# Patient Record
Sex: MALE | NOT HISPANIC OR LATINO | Employment: UNEMPLOYED | URBAN - METROPOLITAN AREA
[De-identification: names, ages, dates, MRNs, and addresses within clinical notes are randomized per-mention and may not be internally consistent; named-entity substitution may affect disease eponyms.]

---

## 2020-08-28 ENCOUNTER — HOSPITAL ENCOUNTER (EMERGENCY)
Facility: HOSPITAL | Age: 35
Discharge: HOME | End: 2020-08-28
Attending: INTERNAL MEDICINE | Admitting: INTERNAL MEDICINE
Payer: COMMERCIAL

## 2020-08-28 VITALS
OXYGEN SATURATION: 100 % | TEMPERATURE: 99.8 F | DIASTOLIC BLOOD PRESSURE: 78 MMHG | BODY MASS INDEX: 21.97 KG/M2 | RESPIRATION RATE: 12 BRPM | HEIGHT: 67 IN | WEIGHT: 140 LBS | SYSTOLIC BLOOD PRESSURE: 122 MMHG | HEART RATE: 77 BPM

## 2020-08-28 DIAGNOSIS — D72.829 LEUKOCYTOSIS: ICD-10-CM

## 2020-08-28 DIAGNOSIS — J02.9 ACUTE PHARYNGITIS: ICD-10-CM

## 2020-08-28 DIAGNOSIS — R00.0 TACHYCARDIA: Primary | ICD-10-CM

## 2020-08-28 DIAGNOSIS — E05.90 HYPERTHYROIDISM: ICD-10-CM

## 2020-08-28 LAB
ALBUMIN SERPL BCP-MCNC: 4.6 G/DL (ref 3.4–4.8)
ALP SERPL-CCNC: 56.6 U/L (ref 10–129)
ALT SERPL W P-5'-P-CCNC: 22 U/L (ref 5–63)
ANION GAP SERPL CALCULATED.3IONS-SCNC: 8 MMOL/L (ref 4–13)
AST SERPL W P-5'-P-CCNC: 32 U/L (ref 15–41)
BASOPHILS # BLD AUTO: 0.04 THOUSANDS/ΜL (ref 0–0.1)
BASOPHILS NFR BLD AUTO: 0 % (ref 0–1)
BILIRUB SERPL-MCNC: 1.93 MG/DL (ref 0.3–1.2)
BUN SERPL-MCNC: 12 MG/DL (ref 6–20)
CALCIUM SERPL-MCNC: 9.8 MG/DL (ref 8.4–10.2)
CHLORIDE SERPL-SCNC: 102 MMOL/L (ref 96–108)
CO2 SERPL-SCNC: 25 MMOL/L (ref 22–33)
CREAT SERPL-MCNC: 1.06 MG/DL (ref 0.5–1.2)
EOSINOPHIL # BLD AUTO: 0.02 THOUSAND/ΜL (ref 0–0.61)
EOSINOPHIL NFR BLD AUTO: 0 % (ref 0–6)
ERYTHROCYTE [DISTWIDTH] IN BLOOD BY AUTOMATED COUNT: 13.5 % (ref 11.6–15.1)
GFR SERPL CREATININE-BSD FRML MDRD: 92 ML/MIN/1.73SQ M
GLUCOSE SERPL-MCNC: 108 MG/DL (ref 65–140)
HCT VFR BLD AUTO: 42.2 % (ref 36.5–49.3)
HETEROPH AB SER QL: NEGATIVE
HGB BLD-MCNC: 14.8 G/DL (ref 12–17)
IMM GRANULOCYTES # BLD AUTO: 0.06 THOUSAND/UL (ref 0–0.2)
IMM GRANULOCYTES NFR BLD AUTO: 0 % (ref 0–2)
LYMPHOCYTES # BLD AUTO: 1.29 THOUSANDS/ΜL (ref 0.6–4.47)
LYMPHOCYTES NFR BLD AUTO: 7 % (ref 14–44)
MCH RBC QN AUTO: 32 PG (ref 26.8–34.3)
MCHC RBC AUTO-ENTMCNC: 35.1 G/DL (ref 31.4–37.4)
MCV RBC AUTO: 91 FL (ref 82–98)
MONOCYTES # BLD AUTO: 1.7 THOUSAND/ΜL (ref 0.17–1.22)
MONOCYTES NFR BLD AUTO: 9 % (ref 4–12)
NEUTROPHILS # BLD AUTO: 15.24 THOUSANDS/ΜL (ref 1.85–7.62)
NEUTS SEG NFR BLD AUTO: 84 % (ref 43–75)
PLATELET # BLD AUTO: 191 THOUSANDS/UL (ref 149–390)
PMV BLD AUTO: 9.6 FL (ref 8.9–12.7)
POTASSIUM SERPL-SCNC: 4.2 MMOL/L (ref 3.5–5)
PROT SERPL-MCNC: 7.3 G/DL (ref 6.4–8.3)
RBC # BLD AUTO: 4.62 MILLION/UL (ref 3.88–5.62)
S PYO DNA THROAT QL NAA+PROBE: NORMAL
SODIUM SERPL-SCNC: 135 MMOL/L (ref 133–145)
TSH SERPL DL<=0.05 MIU/L-ACNC: 0.32 UIU/ML (ref 0.34–5.6)
WBC # BLD AUTO: 18.35 THOUSAND/UL (ref 4.31–10.16)

## 2020-08-28 PROCEDURE — 99284 EMERGENCY DEPT VISIT MOD MDM: CPT | Performed by: PHYSICIAN ASSISTANT

## 2020-08-28 PROCEDURE — 84443 ASSAY THYROID STIM HORMONE: CPT | Performed by: PHYSICIAN ASSISTANT

## 2020-08-28 PROCEDURE — 87651 STREP A DNA AMP PROBE: CPT | Performed by: PHYSICIAN ASSISTANT

## 2020-08-28 PROCEDURE — 96361 HYDRATE IV INFUSION ADD-ON: CPT

## 2020-08-28 PROCEDURE — 85025 COMPLETE CBC W/AUTO DIFF WBC: CPT | Performed by: PHYSICIAN ASSISTANT

## 2020-08-28 PROCEDURE — 93005 ELECTROCARDIOGRAM TRACING: CPT

## 2020-08-28 PROCEDURE — 86308 HETEROPHILE ANTIBODY SCREEN: CPT | Performed by: PHYSICIAN ASSISTANT

## 2020-08-28 PROCEDURE — 96360 HYDRATION IV INFUSION INIT: CPT

## 2020-08-28 PROCEDURE — 80053 COMPREHEN METABOLIC PANEL: CPT | Performed by: PHYSICIAN ASSISTANT

## 2020-08-28 PROCEDURE — 99283 EMERGENCY DEPT VISIT LOW MDM: CPT

## 2020-08-28 PROCEDURE — U0003 INFECTIOUS AGENT DETECTION BY NUCLEIC ACID (DNA OR RNA); SEVERE ACUTE RESPIRATORY SYNDROME CORONAVIRUS 2 (SARS-COV-2) (CORONAVIRUS DISEASE [COVID-19]), AMPLIFIED PROBE TECHNIQUE, MAKING USE OF HIGH THROUGHPUT TECHNOLOGIES AS DESCRIBED BY CMS-2020-01-R: HCPCS | Performed by: PHYSICIAN ASSISTANT

## 2020-08-28 PROCEDURE — 36415 COLL VENOUS BLD VENIPUNCTURE: CPT | Performed by: PHYSICIAN ASSISTANT

## 2020-08-28 RX ORDER — CETIRIZINE HYDROCHLORIDE 5 MG/1
5 TABLET ORAL DAILY
COMMUNITY
End: 2021-11-17

## 2020-08-28 RX ORDER — ACETAMINOPHEN 325 MG/1
650 TABLET ORAL ONCE
Status: COMPLETED | OUTPATIENT
Start: 2020-08-28 | End: 2020-08-28

## 2020-08-28 RX ADMIN — ACETAMINOPHEN 650 MG: 325 TABLET, FILM COATED ORAL at 12:41

## 2020-08-28 RX ADMIN — SODIUM CHLORIDE 1000 ML: 0.9 INJECTION, SOLUTION INTRAVENOUS at 11:01

## 2020-08-28 RX ADMIN — SODIUM CHLORIDE 1000 ML: 0.9 INJECTION, SOLUTION INTRAVENOUS at 11:56

## 2020-08-28 NOTE — ED PROVIDER NOTES
History  Chief Complaint   Patient presents with    Sore Throat     Patient reports bilateral sore throat, sneezing  has been using Zyrtec  denies fevers     30-year-old male comes in today with concerns of a sore throat  He reports that he has been sneezing for the last 2 weeks and has been using Zyrtec with relief  This morning he woke up with a sore throat  Denies any fevers  No difficulty swallowing  Has not had any coffee or tea this morning  He smokes medical marijuana and occasionally drinks alcohol  No CP or SOB  No recent sick contacts          Prior to Admission Medications   Prescriptions Last Dose Informant Patient Reported? Taking? cetirizine (ZyrTEC) 5 MG tablet   Yes Yes   Sig: Take 5 mg by mouth daily      Facility-Administered Medications: None       Past Medical History:   Diagnosis Date    Marijuana use        No past surgical history on file  No family history on file  I have reviewed and agree with the history as documented  E-Cigarette/Vaping     E-Cigarette/Vaping Substances     Social History     Tobacco Use    Smoking status: Never Smoker    Smokeless tobacco: Never Used   Substance Use Topics    Alcohol use: Not on file    Drug use: Not on file       Review of Systems   Constitutional: Negative for fever  HENT: Positive for sneezing and sore throat  Negative for facial swelling  Eyes: Negative for redness  Respiratory: Negative for shortness of breath  Cardiovascular: Negative for chest pain  Gastrointestinal: Negative for abdominal pain  Musculoskeletal: Negative for back pain  Skin: Negative for rash  Neurological: Negative for headaches  Psychiatric/Behavioral: Negative for confusion  Physical Exam  Physical Exam  Constitutional:       Appearance: He is well-developed  HENT:      Head: Normocephalic and atraumatic  Right Ear: There is impacted cerumen (not impacted, but unable to visualize TM due to cerumen in outer ear canal)  Left Ear: Tympanic membrane normal       Nose: Septal deviation and rhinorrhea (clear, slight) present  Mouth/Throat:      Mouth: Mucous membranes are moist       Pharynx: Uvula midline  Posterior oropharyngeal erythema (mild) present  No pharyngeal swelling, oropharyngeal exudate or uvula swelling  Tonsils: No tonsillar exudate or tonsillar abscesses  2+ on the right  2+ on the left  Eyes:      Conjunctiva/sclera: Conjunctivae normal    Neck:      Musculoskeletal: Normal range of motion  No erythema  Thyroid: No thyroid mass, thyromegaly or thyroid tenderness  Cardiovascular:      Rate and Rhythm: Regular rhythm  Tachycardia present  Comments: Tachycardia ranging from 105-135 bpm on monitor, regular  Pulmonary:      Effort: Pulmonary effort is normal    Musculoskeletal: Normal range of motion  Skin:     General: Skin is warm and dry  Neurological:      Mental Status: He is alert and oriented to person, place, and time     Psychiatric:         Behavior: Behavior normal          Vital Signs  ED Triage Vitals   Temperature Pulse Respirations Blood Pressure SpO2   08/28/20 1047 08/28/20 1047 08/28/20 1047 08/28/20 1047 08/28/20 1047   99 8 °F (37 7 °C) (!) 108 18 103/78 98 %      Temp Source Heart Rate Source Patient Position - Orthostatic VS BP Location FiO2 (%)   08/28/20 1047 08/28/20 1047 08/28/20 1047 08/28/20 1047 --   Tympanic Monitor Lying Left arm       Pain Score       08/28/20 1241       6           Vitals:    08/28/20 1110 08/28/20 1112 08/28/20 1115 08/28/20 1154   BP: 117/69 122/79 111/67 122/78   Pulse: 83 (!) 106 (!) 139 77   Patient Position - Orthostatic VS: Lying - Orthostatic VS Sitting - Orthostatic VS Standing - Orthostatic VS Lying         Visual Acuity      ED Medications  Medications   sodium chloride 0 9 % bolus 1,000 mL (0 mL Intravenous Stopped 8/28/20 1154)   sodium chloride 0 9 % bolus 1,000 mL (0 mL Intravenous Stopped 8/28/20 1250)   acetaminophen (TYLENOL) tablet 650 mg (650 mg Oral Given 8/28/20 1241)       Diagnostic Studies  Results Reviewed     Procedure Component Value Units Date/Time    Novel Coronavirus (COVID-19), PCR [facility] [[id number]] Collected:  08/28/20 1244    Lab Status: In process Specimen:  Nares from Nasopharyngeal Swab Updated:  08/28/20 1246    Mononucleosis screen [[id number]]     Lab Status:  No result Specimen:  Blood     Strep A PCR [[id number]]  (Normal) Collected:  08/28/20 1100    Lab Status:  Final result Specimen:  Throat Updated:  08/28/20 1207     STREP A PCR None Detected    TSH [[id number]]  (Abnormal) Collected:  08/28/20 1100    Lab Status:  Final result Specimen:  Blood from Hand, Left Updated:  08/28/20 1147     TSH 3RD GENERATON 0 324 uIU/mL     Narrative:       Patients undergoing fluorescein dye angiography may retain small amounts of fluorescein in the body for 48-72 hours post procedure  Samples containing fluorescein can produce falsely depressed TSH values  If the patient had this procedure,a specimen should be resubmitted post fluorescein clearance        Comprehensive metabolic panel [[id number]]  (Abnormal) Collected:  08/28/20 1100    Lab Status:  Final result Specimen:  Blood from Hand, Left Updated:  08/28/20 1133     Sodium 135 mmol/L      Potassium 4 2 mmol/L      Chloride 102 mmol/L      CO2 25 mmol/L      ANION GAP 8 mmol/L      BUN 12 mg/dL      Creatinine 1 06 mg/dL      Glucose 108 mg/dL      Calcium 9 8 mg/dL      AST 32 U/L      ALT 22 U/L      Alkaline Phosphatase 56 6 U/L      Total Protein 7 3 g/dL      Albumin 4 6 g/dL      Total Bilirubin 1 93 mg/dL      eGFR 92 ml/min/1 73sq m     Narrative:       Destinee guidelines for Chronic Kidney Disease (CKD):     Stage 1 with normal or high GFR (GFR > 90 mL/min/1 73 square meters)    Stage 2 Mild CKD (GFR = 60-89 mL/min/1 73 square meters)    Stage 3A Moderate CKD (GFR = 45-59 mL/min/1 73 square meters)    Stage 3B Moderate CKD (GFR = 30-44 mL/min/1 73 square meters)    Stage 4 Severe CKD (GFR = 15-29 mL/min/1 73 square meters)    Stage 5 End Stage CKD (GFR <15 mL/min/1 73 square meters)  Note: GFR calculation is accurate only with a steady state creatinine    CBC and differential [[id number]]  (Abnormal) Collected:  08/28/20 1100    Lab Status:  Final result Specimen:  Blood from Hand, Left Updated:  08/28/20 1106     WBC 18 35 Thousand/uL      RBC 4 62 Million/uL      Hemoglobin 14 8 g/dL      Hematocrit 42 2 %      MCV 91 fL      MCH 32 0 pg      MCHC 35 1 g/dL      RDW 13 5 %      MPV 9 6 fL      Platelets 033 Thousands/uL      Neutrophils Relative 84 %      Immat GRANS % 0 %      Lymphocytes Relative 7 %      Monocytes Relative 9 %      Eosinophils Relative 0 %      Basophils Relative 0 %      Neutrophils Absolute 15 24 Thousands/µL      Immature Grans Absolute 0 06 Thousand/uL      Lymphocytes Absolute 1 29 Thousands/µL      Monocytes Absolute 1 70 Thousand/µL      Eosinophils Absolute 0 02 Thousand/µL      Basophils Absolute 0 04 Thousands/µL                  No orders to display              Procedures  Procedures         ED Course  ED Course as of Aug 28 1250   Fri Aug 28, 2020   1100 EKG performed at 1054 shows sinus tachycardia with a rate of 106 beats per minute, normal axis, no ectopy, normal J-point elevation, somewhat flattened T-waves inferior and laterally      1250 Upon trying to discharge the patient, he was complaining of body aches and worsening sore throat  I re-examined his throat which still does not show any signs of peritonsillar abscess  Still only has mild erythema  Will add on COVID screen as well as mono  US AUDIT      Most Recent Value   Initial Alcohol Screen: US AUDIT-C    1  How often do you have a drink containing alcohol? 3 Filed at: 08/28/2020 1050   2  How many drinks containing alcohol do you have on a typical day you are drinking? 2 Filed at: 08/28/2020 1050   3a  Male UNDER 65:  How often do you have five or more drinks on one occasion? 0 Filed at: 08/28/2020 1050   Audit-C Score  5 Filed at: 08/28/2020 1050                  ELMER/DAST-10      Most Recent Value   How many times in the past year have you    Used an illegal drug or used a prescription medication for non-medical reasons? Never [medical Miles Bottcher at: 08/28/2020 1049   In the past 12 months      1  Have you used drugs other than those required for medical reasons? 0 Filed at: 08/28/2020 1049   2  Do you use more than one drug at a time? 0 Filed at: 08/28/2020 1049   3  Have you had medical problems as a result of your drug use (e g , memory loss, hepatitis, convulsions, bleeding, etc )? 0 Filed at: 08/28/2020 1049   4  Have you had "blackouts" or "flashbacks" as a result of drug use? YesNo  0 Filed at: 08/28/2020 1049   5  Do you ever feel bad or guilty about your drug use? 0 Filed at: 08/28/2020 1049   6  Does your spouse (or parent) ever complain about your involvement with drugs? 0 Filed at: 08/28/2020 1049   7  Have you neglected your family because of your use of drugs? 0 Filed at: 08/28/2020 1049   8  Have you engaged in illegal activities in order to obtain drugs? 0 Filed at: 08/28/2020 1049   9  Have you ever experienced withdrawal symptoms (felt sick) when you stopped taking drugs? 0 Filed at: 08/28/2020 1049   10  Are you always able to stop using drugs when you want to?  0 Filed at: 08/28/2020 1049   DAST-10 Score  0 Filed at: 08/28/2020 1049                                MDM  Number of Diagnoses or Management Options  Acute pharyngitis:   Tachycardia:   Diagnosis management comments: 81-year-old male coming in today complaining of a sore throat that began today  There is no evidence of tonsillar abscess that and he only has mild erythema  Given patient's WBC of 18, favor viral etiology  Will send mono screen as well    I explained to the patient that his TSH was slightly low and he may have hyperactive thyroid and he should follow up with the primary care physician to further manage this  Explained this might be contributing to his tachycardia  Advised over-the-counter remedies for his sore throat and to return to the emergency department for any new or worsening symptoms  Disposition  Final diagnoses:   Tachycardia   Acute pharyngitis   Hyperthyroidism   Leukocytosis     Time reflects when diagnosis was documented in both MDM as applicable and the Disposition within this note     Time User Action Codes Description Comment    8/28/2020 11:06 AM Donzell Norse Add [R00 0] Tachycardia     8/28/2020 12:25 PM Donzell Norse Add [J02 9] Acute pharyngitis     8/28/2020 12:26 PM Donzell Norse Add [E05 90] Hyperthyroidism     8/28/2020 12:26 PM Donzell Norse Add [B46 714] Leukocytosis       ED Disposition     ED Disposition Condition Date/Time Comment    Discharge Stable Fri Aug 28, 2020 12:27 PM Guillermina Long discharge to home/self care  Follow-up Information     Follow up With Specialties Details Why Contact Info    Cristina Quiroga MD Family Medicine   [id number] [place] [address],[address]  [address]  [address]  [phone]      [facility]    [phone]            Patient's Medications   Discharge Prescriptions    No medications on file     No discharge procedures on file      PDMP Review     None          ED Provider  Electronically Signed by           Rolando Ba PA-C  08/28/20 7400 Jarrett Brush PA-C  08/28/20 4100 Jarrett Brush PA-C  08/28/20 2426

## 2020-08-28 NOTE — DISCHARGE INSTRUCTIONS
Return to the emergency department for any new worsening or concerning symptoms  Follow-up with your primary care physician regarding your abnormal thyroid test and your elevated white blood cell count

## 2020-08-29 ENCOUNTER — APPOINTMENT (EMERGENCY)
Dept: RADIOLOGY | Facility: HOSPITAL | Age: 35
End: 2020-08-29
Payer: COMMERCIAL

## 2020-08-29 ENCOUNTER — HOSPITAL ENCOUNTER (EMERGENCY)
Facility: HOSPITAL | Age: 35
Discharge: HOME | End: 2020-08-29
Payer: COMMERCIAL

## 2020-08-29 VITALS
WEIGHT: 140 LBS | OXYGEN SATURATION: 100 % | RESPIRATION RATE: 18 BRPM | HEIGHT: 67 IN | DIASTOLIC BLOOD PRESSURE: 64 MMHG | BODY MASS INDEX: 21.97 KG/M2 | HEART RATE: 104 BPM | TEMPERATURE: 98.7 F | SYSTOLIC BLOOD PRESSURE: 110 MMHG

## 2020-08-29 DIAGNOSIS — S43.401A SPRAIN OF RIGHT SHOULDER, UNSPECIFIED SHOULDER SPRAIN TYPE, INITIAL ENCOUNTER: Primary | ICD-10-CM

## 2020-08-29 PROCEDURE — 99283 EMERGENCY DEPT VISIT LOW MDM: CPT

## 2020-08-29 PROCEDURE — 72040 X-RAY EXAM NECK SPINE 2-3 VW: CPT

## 2020-08-29 PROCEDURE — 73030 X-RAY EXAM OF SHOULDER: CPT

## 2020-08-29 PROCEDURE — 99284 EMERGENCY DEPT VISIT MOD MDM: CPT

## 2020-08-29 PROCEDURE — 96372 THER/PROPH/DIAG INJ SC/IM: CPT

## 2020-08-29 RX ORDER — KETOROLAC TROMETHAMINE 30 MG/ML
30 INJECTION, SOLUTION INTRAMUSCULAR; INTRAVENOUS ONCE
Status: COMPLETED | OUTPATIENT
Start: 2020-08-29 | End: 2020-08-29

## 2020-08-29 RX ORDER — CYCLOBENZAPRINE HCL 10 MG
10 TABLET ORAL 2 TIMES DAILY PRN
Qty: 20 TABLET | Refills: 0 | Status: SHIPPED | OUTPATIENT
Start: 2020-08-29 | End: 2021-11-17

## 2020-08-29 RX ORDER — NAPROXEN 500 MG/1
500 TABLET ORAL 2 TIMES DAILY WITH MEALS
Qty: 30 TABLET | Refills: 0 | Status: SHIPPED | OUTPATIENT
Start: 2020-08-29 | End: 2021-11-17

## 2020-08-29 RX ADMIN — KETOROLAC TROMETHAMINE 30 MG: 30 INJECTION, SOLUTION INTRAMUSCULAR at 05:20

## 2020-08-29 NOTE — ED PROVIDER NOTES
History  Chief Complaint   Patient presents with    Shoulder Pain     woke up with right shoulder pain, was seen in this ed sore throat- pt tstaes he had shoulder pain yesterday as well but was not as bad as today  24-year-old male no significant past medical history is complaining of few days of right shoulder pain  Patient denies any specific injury although says he may have injured it with a certain lifting maneuver  Patient states he has also been having in symptoms from is right side of his neck all way down his right arm intermittently for the past month  Patient denies any direct trauma patient denies any fever chills patient denies any weakness patient denies any nausea vomiting  Patient is right handed  Patient was recently here in this emergency department for sore throat which was workup I did have a ovid test which is still pending  Prior to Admission Medications   Prescriptions Last Dose Informant Patient Reported? Taking? cetirizine (ZyrTEC) 5 MG tablet   Yes No   Sig: Take 5 mg by mouth daily      Facility-Administered Medications: None       Past Medical History:   Diagnosis Date    Marijuana use        History reviewed  No pertinent surgical history  History reviewed  No pertinent family history  I have reviewed and agree with the history as documented  E-Cigarette/Vaping     E-Cigarette/Vaping Substances     Social History     Tobacco Use    Smoking status: Never Smoker    Smokeless tobacco: Never Used   Substance Use Topics    Alcohol use: Yes     Frequency: 2-4 times a month     Drinks per session: 1 or 2     Binge frequency: Never    Drug use: Yes     Types: Marijuana     Comment: Medical marijuana       Review of Systems   Constitutional: Negative for chills and fever  HENT: Negative for congestion  Eyes: Negative for visual disturbance  Respiratory: Negative for shortness of breath  Cardiovascular: Negative for chest pain     Gastrointestinal: Negative for abdominal pain  Endocrine: Negative for cold intolerance  Genitourinary: Negative for frequency  Musculoskeletal: Positive for arthralgias and neck pain  Negative for gait problem  Skin: Negative for rash  Neurological: Negative for dizziness  Psychiatric/Behavioral: Negative for behavioral problems and confusion  Physical Exam  Physical Exam  Vitals signs and nursing note reviewed  Constitutional:       Appearance: He is well-developed  HENT:      Head: Normocephalic and atraumatic  Eyes:      Conjunctiva/sclera: Conjunctivae normal       Pupils: Pupils are equal, round, and reactive to light  Neck:      Musculoskeletal: Normal range of motion and neck supple  Cardiovascular:      Rate and Rhythm: Normal rate and regular rhythm  Heart sounds: Normal heart sounds  Pulmonary:      Effort: Pulmonary effort is normal       Breath sounds: Normal breath sounds  Abdominal:      General: Bowel sounds are normal       Palpations: Abdomen is soft  Musculoskeletal:      Comments: Patient has significant limitation abduction external rotation right shoulder he also has tenderness to palpation over the bicipital groove patient has 5/5 strength testing of the right upper extremity pulses are 2+ and strong at brachial and radial pulse check sites   Skin:     General: Skin is warm and dry  Capillary Refill: Capillary refill takes less than 2 seconds  Neurological:      Mental Status: He is alert and oriented to person, place, and time     Psychiatric:         Behavior: Behavior normal          Vital Signs  ED Triage Vitals [08/29/20 0501]   Temperature Pulse Respirations Blood Pressure SpO2   98 7 °F (37 1 °C) 104 18 110/64 100 %      Temp Source Heart Rate Source Patient Position - Orthostatic VS BP Location FiO2 (%)   Tympanic Monitor Lying Right arm --      Pain Score       9           Vitals:    08/29/20 0501   BP: 110/64   Pulse: 104   Patient Position - Orthostatic VS: Lying         Visual Acuity      ED Medications  Medications   ketorolac (TORADOL) injection 30 mg (30 mg Intramuscular Given 8/29/20 0520)       Diagnostic Studies  Results Reviewed     None                 XR shoulder 2+ views RIGHT   ED Interpretation by Jessica Marquez MD (08/29 2479)   X-ray right shoulder demonstrates no acute fracture no dislocation no bony abnormality      XR spine cervical 2 or 3 vw injury   ED Interpretation by Jessica Marquez MD (08/29 9608)   X-ray cervical spine demonstrates normal bony structure no acute fracture or no subluxation or dislocation                 Procedures  Procedures         ED Course                                             MDM  Number of Diagnoses or Management Options  Diagnosis management comments: Patient was monitored in the emergency department received Toradol 30 mg IM patient an arm sling placed x-ray right shoulder demonstrated no acute findings x-ray cervical spine demonstrate no acute findings  Plan is discharge patient home patient will be placed on naproxen Flexeril and given arm sling  Patient be referred to Orthopedics on-call for follow-up in 1 week if symptoms are not improving  Impression is right shoulder strain        Disposition  Final diagnoses:   Sprain of right shoulder, unspecified shoulder sprain type, initial encounter     Time reflects when diagnosis was documented in both MDM as applicable and the Disposition within this note     Time User Action Codes Description Comment    8/29/2020  6:25 AM Bhakti Alexis Add [S43 401A] Sprain of right shoulder, unspecified shoulder sprain type, initial encounter       ED Disposition     ED Disposition Condition Date/Time Comment    Discharge Stable Sat Aug 29, 2020  6:25 AM [facility] discharge to home/self care              Follow-up Information     Follow up With Specialties Details Why Contact Info Additional [address] [facility] [place] [facility] Schedule an appointment as soon as possible for a visit in 1 week If symptoms worsen [address] [facility], [address] [place], [place], [address] [phone]          Patient's Medications   Discharge Prescriptions    CYCLOBENZAPRINE (FLEXERIL) 10 MG TABLET    Take 1 tablet (10 mg total) by mouth 2 (two) times a day as needed for muscle spasms       Start Date: 8/29/2020 End Date: --       Order Dose: 10 mg       Quantity: 20 tablet    Refills: 0    NAPROXEN (NAPROSYN) 500 MG TABLET    Take 1 tablet (500 mg total) by mouth 2 (two) times a day with meals       Start Date: 8/29/2020 End Date: --       Order Dose: 500 mg       Quantity: 30 tablet    Refills: 0     No discharge procedures on file      PDMP Review     None          ED Provider  Electronically Signed by           Elizabeth Olsen MD  08/29/20 3621

## 2020-08-30 ENCOUNTER — TELEPHONE (OUTPATIENT)
Dept: OTHER | Facility: OTHER | Age: 35
End: 2020-08-30

## 2020-08-30 ENCOUNTER — HOSPITAL ENCOUNTER (EMERGENCY)
Facility: HOSPITAL | Age: 35
Discharge: HOME | End: 2020-08-30
Attending: EMERGENCY MEDICINE | Admitting: EMERGENCY MEDICINE
Payer: COMMERCIAL

## 2020-08-30 VITALS
TEMPERATURE: 98.9 F | HEART RATE: 79 BPM | SYSTOLIC BLOOD PRESSURE: 121 MMHG | BODY MASS INDEX: 21.93 KG/M2 | WEIGHT: 140 LBS | OXYGEN SATURATION: 100 % | DIASTOLIC BLOOD PRESSURE: 79 MMHG | RESPIRATION RATE: 18 BRPM

## 2020-08-30 DIAGNOSIS — L03.114 CELLULITIS OF LEFT UPPER EXTREMITY: Primary | ICD-10-CM

## 2020-08-30 DIAGNOSIS — M25.561 ARTHRALGIA OF RIGHT KNEE: ICD-10-CM

## 2020-08-30 DIAGNOSIS — S46.911A STRAIN OF RIGHT SHOULDER, INITIAL ENCOUNTER: ICD-10-CM

## 2020-08-30 LAB
ATRIAL RATE: 110 BPM
P AXIS: 59 DEGREES
PR INTERVAL: 189 MS
QRS AXIS: 81 DEGREES
QRSD INTERVAL: 84 MS
QT INTERVAL: 337 MS
QTC INTERVAL: 448 MS
SARS-COV-2 RNA SPEC QL NAA+PROBE: NOT DETECTED
T WAVE AXIS: 35 DEGREES
VENTRICULAR RATE: 106 BPM

## 2020-08-30 PROCEDURE — 93010 ELECTROCARDIOGRAM REPORT: CPT | Performed by: INTERNAL MEDICINE

## 2020-08-30 PROCEDURE — 99284 EMERGENCY DEPT VISIT MOD MDM: CPT | Performed by: EMERGENCY MEDICINE

## 2020-08-30 PROCEDURE — 99283 EMERGENCY DEPT VISIT LOW MDM: CPT

## 2020-08-30 RX ORDER — CEPHALEXIN 500 MG/1
500 CAPSULE ORAL EVERY 6 HOURS SCHEDULED
Qty: 28 CAPSULE | Refills: 0 | Status: SHIPPED | OUTPATIENT
Start: 2020-08-30 | End: 2020-09-06

## 2020-08-30 RX ORDER — CEPHALEXIN 250 MG/1
500 CAPSULE ORAL ONCE
Status: COMPLETED | OUTPATIENT
Start: 2020-08-30 | End: 2020-08-30

## 2020-08-30 RX ORDER — IBUPROFEN 600 MG/1
600 TABLET ORAL ONCE
Status: COMPLETED | OUTPATIENT
Start: 2020-08-30 | End: 2020-08-30

## 2020-08-30 RX ADMIN — IBUPROFEN 600 MG: 600 TABLET ORAL at 11:29

## 2020-08-30 RX ADMIN — CEPHALEXIN 500 MG: 250 CAPSULE ORAL at 11:29

## 2020-08-30 NOTE — TELEPHONE ENCOUNTER
The patient was called for notification of a test result for COVID-19  The patient did not answer the phone and a voicemail was left requesting a call back to [phone], Option 7

## 2020-08-30 NOTE — TELEPHONE ENCOUNTER
Your test for COVID-19, also known as novel coronavirus, came back negative  You do not have COVID-19  If you have any additional questions, we can schedule a virtual visit for you with a provider or call the [facility] [phone] Option 7 for care advice  For additional information , please visit the Coronavirus FAQ on the [address]  ([facility])

## 2020-08-30 NOTE — ED PROVIDER NOTES
History  Chief Complaint   Patient presents with    Hand Problem     Patient reports left hand swelling from getting blood work done yesterday at [facility]  States he woke up with his hand swollen   Knee Pain     Patient c/o right knee pain  "I think it's because I am using my right leg more since I was bite last year by a dog in my left leg "      Patient is a 43-year-old male with no significant past medical history who presents with left hand and right knee pain  Patient states that he was at [facility] 2 days ago for a sore throat and right shoulder pain  He states that they addressed his sore throat but did not address his shoulder pain  Therefore he returned yesterday and was diagnosed with right shoulder sprain  He was put in a sling and referred to Orthopedic surgery  Patient states that he had blood drawn and an IV placed in his left hand during his 1st visit  He describes swelling of his left hand that started about 24 hours after the IV was removed  The pain and swelling is primarily in his 2nd MCP area and extends proximally toward the area IV insertion  He denies fever, chills nausea, vomiting, myalgias  He does complain of atraumatic right knee pain  The pain is primarily along the medial aspect of his right knee  He attributes the pain to compensating for his chronic left leg pain  He sustained multiple dog bites to his left leg which left him with chronic pain  He is still able to ambulate despite his acute right knee pain  He has not been able to fill the prescription for naproxen that was written yesterday         History provided by:  Patient  Hand Pain   Location:  [place] hand  Severity:  Moderate  Duration:  1 day  Timing:  Constant  Progression:  Worsening  Chronicity:  New  Ineffective treatments:  None tried  Associated symptoms: no abdominal pain, no chest pain, no cough, no diarrhea, no fever, no headaches, no nausea, no rash, no shortness of breath, no sore throat and no vomiting        Prior to Admission Medications   Prescriptions Last Dose Informant Patient Reported? Taking? cetirizine (ZyrTEC) 5 MG tablet   Yes No   Sig: Take 5 mg by mouth daily   cyclobenzaprine (FLEXERIL) 10 mg tablet   No No   Sig: Take 1 tablet (10 mg total) by mouth 2 (two) times a day as needed for muscle spasms   naproxen (NAPROSYN) 500 mg tablet   No No   Sig: Take 1 tablet (500 mg total) by mouth 2 (two) times a day with meals      Facility-Administered Medications: None       Past Medical History:   Diagnosis Date    Marijuana use        History reviewed  No pertinent surgical history  History reviewed  No pertinent family history  I have reviewed and agree with the history as documented  E-Cigarette/Vaping     E-Cigarette/Vaping Substances     Social History     Tobacco Use    Smoking status: Never Smoker    Smokeless tobacco: Never Used   Substance Use Topics    Alcohol use: Yes     Frequency: 2-4 times a month     Drinks per session: 1 or 2     Binge frequency: Never    Drug use: Yes     Types: Marijuana     Comment: Medical marijuana       Review of Systems   Constitutional: Negative for chills, diaphoresis and fever  HENT: Negative for nosebleeds, sore throat and trouble swallowing  Eyes: Negative for photophobia, pain and visual disturbance  Respiratory: Negative for cough, chest tightness and shortness of breath  Cardiovascular: Negative for chest pain, palpitations and leg swelling  Gastrointestinal: Negative for abdominal pain, constipation, diarrhea, nausea and vomiting  Endocrine: Negative for polydipsia and polyuria  Genitourinary: Negative for difficulty urinating, dysuria and hematuria  Musculoskeletal: Positive for arthralgias  Negative for back pain, neck pain and neck stiffness  Skin: Negative for pallor and rash  Neurological: Negative for dizziness, syncope, light-headedness and headaches     All other systems reviewed and are negative  Physical Exam  Physical Exam  Vitals signs and nursing note reviewed  Constitutional:       General: He is not in acute distress  Appearance: He is well-developed  HENT:      Head: Normocephalic and atraumatic  Eyes:      Pupils: Pupils are equal, round, and reactive to light  Neck:      Musculoskeletal: Normal range of motion and neck supple  Cardiovascular:      Rate and Rhythm: Normal rate and regular rhythm  Pulses: Normal pulses  Heart sounds: Normal heart sounds  Pulmonary:      Effort: Pulmonary effort is normal  No respiratory distress  Breath sounds: Normal breath sounds  Abdominal:      General: There is no distension  Palpations: Abdomen is soft  Abdomen is not rigid  Tenderness: There is no abdominal tenderness  There is no guarding or rebound  Musculoskeletal: Normal range of motion  Right knee: He exhibits normal range of motion, no swelling and no effusion  Tenderness (Mild tenderness along the medial femoral condyle) found  Hands:    Lymphadenopathy:      Cervical: No cervical adenopathy  Skin:     General: Skin is warm and dry  Capillary Refill: Capillary refill takes less than 2 seconds  Neurological:      Mental Status: He is alert and oriented to person, place, and time  Cranial Nerves: No cranial nerve deficit  Sensory: No sensory deficit           Vital Signs  ED Triage Vitals   Temperature Pulse Respirations Blood Pressure SpO2   08/30/20 1030 08/30/20 1029 08/30/20 1029 08/30/20 1029 08/30/20 1029   98 9 °F (37 2 °C) 79 18 121/79 100 %      Temp Source Heart Rate Source Patient Position - Orthostatic VS BP Location FiO2 (%)   08/30/20 1030 08/30/20 1029 08/30/20 1029 08/30/20 1029 --   Oral Monitor Lying Right arm       Pain Score       08/30/20 1129       Worst Possible Pain           Vitals:    08/30/20 1029   BP: 121/79   Pulse: 79   Patient Position - Orthostatic VS: Lying         Visual Acuity      ED Medications  Medications   cephalexin (KEFLEX) capsule 500 mg (500 mg Oral Given 8/30/20 1129)   ibuprofen (MOTRIN) tablet 600 mg (600 mg Oral Given 8/30/20 1129)       Diagnostic Studies  Results Reviewed     None                 No orders to display              Procedures  Procedures         ED Course       US AUDIT      Most Recent Value   Initial Alcohol Screen: US AUDIT-C    1  How often do you have a drink containing alcohol?  0 Filed at: 08/30/2020 1039   2  How many drinks containing alcohol do you have on a typical day you are drinking? 0 Filed at: 08/30/2020 1039   3a  Male UNDER 65: How often do you have five or more drinks on one occasion? 0 Filed at: 08/30/2020 1039   3b  FEMALE Any Age, or MALE 65+: How often do you have 4 or more drinks on one occassion? 0 Filed at: 08/30/2020 1039   Audit-C Score  0 Filed at: 08/30/2020 1039                  ELMER/DAST-10      Most Recent Value   How many times in the past year have you    Used an illegal drug or used a prescription medication for non-medical reasons? Never [patient uses medical Yamil Her at: 08/30/2020 1039                                MDM  Number of Diagnoses or Management Options  Arthralgia of right knee: new and does not require workup  Cellulitis of left upper extremity: new and does not require workup  Strain of right shoulder, initial encounter:   Diagnosis management comments: Patient presents with chief complaint of left hand pain  Patient states that pain started about 4 hours after his visit to the emergency department  He did have an IV placed in that hand  Suspicious for cellulitis  No evidence of wounds, such as fight bite  Do not suspect septic arthritis or deep space infection of the hand  Will treat p o  Keflex  I also considered inflammatory arthritis given patient's other arthralgias  Patient advised follow-up with his PCP, particularly if his symptoms do not improve    I recommended he fill the prescription for naproxen that was written yesterday  NSAIDs should help for his arthralgias and possible inflammatory arthritis  He states that he does have a PCP but has not seen them in 2 years  I recommended he call them on Monday to schedule a follow-up appointment  Patient is nontoxic appearing and stable for discharge  Amount and/or Complexity of Data Reviewed  Review and summarize past medical records: yes    Risk of Complications, Morbidity, and/or Mortality  Presenting problems: moderate  Diagnostic procedures: minimal  Management options: moderate    Patient Progress  Patient progress: stable        Disposition  Final diagnoses:   Cellulitis of left upper extremity   Arthralgia of right knee   Strain of right shoulder, initial encounter     Time reflects when diagnosis was documented in both MDM as applicable and the Disposition within this note     Time User Action Codes Description Comment    8/30/2020 11:18 AM Cheryal Maillakisha NG Add [L03 114] Cellulitis of left upper extremity     8/30/2020 11:18 AM Janettyal Maillard L Add [M25 561] Arthralgia of right knee     8/30/2020 11:18 AM Rambo Turner Add [D14 888R] Strain of right shoulder, initial encounter       ED Disposition     ED Disposition Condition Date/Time Comment    Discharge Stable Sun Aug 30, 2020 11:18 AM Caryn Vu discharge to home/self care              Follow-up Information     Follow up With Specialties Details Why Contact Info    [employer]  Schedule an appointment as soon as possible for a visit  Return to ED sooner if symptoms worsen or persist  [phone]            Discharge Medication List as of 8/30/2020 11:20 AM      START taking these medications    Details   cephalexin (KEFLEX) 500 mg capsule Take 1 capsule (500 mg total) by mouth every 6 (six) hours for 7 days, Starting Sun 8/30/2020, Until Sun 9/6/2020, Normal         CONTINUE these medications which have NOT CHANGED    Details   cetirizine (ZyrTEC) 5 MG tablet Take 5 mg by mouth daily, Historical Med      cyclobenzaprine (FLEXERIL) 10 mg tablet Take 1 tablet (10 mg total) by mouth 2 (two) times a day as needed for muscle spasms, Starting Sat 8/29/2020, Normal      naproxen (NAPROSYN) 500 mg tablet Take 1 tablet (500 mg total) by mouth 2 (two) times a day with meals, Starting Sat 8/29/2020, Normal           No discharge procedures on file      PDMP Review     None          ED Provider  Electronically Signed by           Bethany Shane DO  08/30/20 0124

## 2020-09-01 ENCOUNTER — TELEPHONE (OUTPATIENT)
Dept: EMERGENCY DEPT | Facility: HOSPITAL | Age: 35
End: 2020-09-01

## 2021-09-14 ENCOUNTER — HOSPITAL ENCOUNTER (EMERGENCY)
Facility: HOSPITAL | Age: 36
Discharge: HOME | End: 2021-09-14
Attending: EMERGENCY MEDICINE | Admitting: EMERGENCY MEDICINE
Payer: COMMERCIAL

## 2021-09-14 ENCOUNTER — APPOINTMENT (EMERGENCY)
Dept: RADIOLOGY | Facility: HOSPITAL | Age: 36
End: 2021-09-14
Payer: COMMERCIAL

## 2021-09-14 VITALS
OXYGEN SATURATION: 98 % | HEART RATE: 63 BPM | DIASTOLIC BLOOD PRESSURE: 70 MMHG | SYSTOLIC BLOOD PRESSURE: 112 MMHG | RESPIRATION RATE: 18 BRPM | TEMPERATURE: 98.1 F

## 2021-09-14 DIAGNOSIS — R11.2 NAUSEA AND VOMITING: ICD-10-CM

## 2021-09-14 DIAGNOSIS — J06.9 VIRAL URI: Primary | ICD-10-CM

## 2021-09-14 LAB — SARS-COV-2 RNA RESP QL NAA+PROBE: NEGATIVE

## 2021-09-14 PROCEDURE — U0005 INFEC AGEN DETEC AMPLI PROBE: HCPCS | Performed by: EMERGENCY MEDICINE

## 2021-09-14 PROCEDURE — U0003 INFECTIOUS AGENT DETECTION BY NUCLEIC ACID (DNA OR RNA); SEVERE ACUTE RESPIRATORY SYNDROME CORONAVIRUS 2 (SARS-COV-2) (CORONAVIRUS DISEASE [COVID-19]), AMPLIFIED PROBE TECHNIQUE, MAKING USE OF HIGH THROUGHPUT TECHNOLOGIES AS DESCRIBED BY CMS-2020-01-R: HCPCS | Performed by: EMERGENCY MEDICINE

## 2021-09-14 PROCEDURE — 99285 EMERGENCY DEPT VISIT HI MDM: CPT | Performed by: EMERGENCY MEDICINE

## 2021-09-14 PROCEDURE — 71045 X-RAY EXAM CHEST 1 VIEW: CPT

## 2021-09-14 PROCEDURE — 99283 EMERGENCY DEPT VISIT LOW MDM: CPT

## 2021-09-14 RX ORDER — ONDANSETRON 4 MG/1
4 TABLET, FILM COATED ORAL EVERY 6 HOURS
Qty: 12 TABLET | Refills: 0 | Status: SHIPPED | OUTPATIENT
Start: 2021-09-14 | End: 2021-11-17

## 2021-09-14 RX ORDER — ONDANSETRON 4 MG/1
4 TABLET, ORALLY DISINTEGRATING ORAL ONCE
Status: COMPLETED | OUTPATIENT
Start: 2021-09-14 | End: 2021-09-14

## 2021-09-14 RX ADMIN — ONDANSETRON 4 MG: 4 TABLET, ORALLY DISINTEGRATING ORAL at 06:30

## 2021-09-14 NOTE — Clinical Note
Luz White was seen and treated in our emergency department on 9/14/2021  Diagnosis:     Werner Haynes    He may return on this date: If you have any questions or concerns, please don't hesitate to call        Alina Ureña MD    ______________________________           _______________          _______________  Hospital Representative                              Date                                Time

## 2021-09-14 NOTE — ED ATTENDING ATTESTATION
9/14/2021  [place] MD, saw and evaluated the patient  I have discussed the patient with the resident/non-physician practitioner and agree with the resident's/non-physician practitioner's findings, Plan of Care, and MDM as documented in the resident's/non-physician practitioner's note, except where noted  All available labs and Radiology studies were reviewed  I was present for key portions of any procedure(s) performed by the resident/non-physician practitioner and I was immediately available to provide assistance  At this point I agree with the current assessment done in the Emergency Department  I have conducted an independent evaluation of this patient a history and physical is as follows:    ED Course     Emergency Department Note- Arabella Crockett 31 y o  male MRN: [id number]    Unit/Bed#: ED 01 Encounter: [id number]    Arabella Crockett is a 31 y o  male who presents with   Chief Complaint   Patient presents with    Vomiting     Pt reports vomiting, sore throat, cold symptoms; pt states daughter was sick recently         History of Present Illness   HPI:  Arabella Crockett is a 31 y o  male who presents for evaluation of:  Nausea, vomiting, sore throat, congestion, dry cough  Patient does have a sick contact at home, his daughter with similar symptoms  Patient denies direct contact with anybody that had COVID  Patient's vomitus was primarily mucus; patient denies hematemesis  Patient denies associated abdominal pain and diarrhea  Review of Systems   Constitutional: Positive for chills  Negative for fever  HENT: Positive for congestion and rhinorrhea  Respiratory: Positive for cough and shortness of breath  Gastrointestinal: Positive for nausea and vomiting  Negative for abdominal pain and diarrhea  All other systems reviewed and are negative  Historical Information   Past Medical History:   Diagnosis Date    Marijuana use      History reviewed   No pertinent surgical history  Social History   Social History     Substance and Sexual Activity   Alcohol Use Yes     Social History     Substance and Sexual Activity   Drug Use Yes    Types: Marijuana    Comment: Medical marijuana     Social History     Tobacco Use   Smoking Status Never Smoker   Smokeless Tobacco Never Used     Family History: History reviewed  No pertinent family history  Meds/Allergies   PTA meds:   Prior to Admission Medications   Prescriptions Last Dose Informant Patient Reported? Taking? cetirizine (ZyrTEC) 5 MG tablet   Yes No   Sig: Take 5 mg by mouth daily   cyclobenzaprine (FLEXERIL) 10 mg tablet   No No   Sig: Take 1 tablet (10 mg total) by mouth 2 (two) times a day as needed for muscle spasms   naproxen (NAPROSYN) 500 mg tablet   No No   Sig: Take 1 tablet (500 mg total) by mouth 2 (two) times a day with meals      Facility-Administered Medications: None     No Known Allergies    Objective   First Vitals:   Blood Pressure: 112/70 (09/14/21 0613)  Pulse: 63 (09/14/21 0613)  Temperature: 98 1 °F (36 7 °C) (09/14/21 0613)  Temp Source: Oral (09/14/21 6942)  Respirations: 18 (09/14/21 0613)  SpO2: 98 % (09/14/21 0613)    Current Vitals:   Blood Pressure: 112/70 (09/14/21 8970)  Pulse: 63 (09/14/21 0613)  Temperature: 98 1 °F (36 7 °C) (09/14/21 0613)  Temp Source: Oral (09/14/21 1349)  Respirations: 18 (09/14/21 0613)  SpO2: 98 % (09/14/21 0613)    No intake or output data in the 24 hours ending 09/14/21 0724    Invasive Devices     None                 Physical Exam  Vitals and nursing note reviewed  Constitutional:       Appearance: He is well-developed  HENT:      Head: Normocephalic and atraumatic  Right Ear: External ear normal       Left Ear: External ear normal       Nose: Nose normal       Mouth/Throat:      Pharynx: No oropharyngeal exudate  Eyes:      Pupils: Pupils are equal, round, and reactive to light  Cardiovascular:      Rate and Rhythm: Normal rate and regular rhythm  Pulmonary:      Effort: Pulmonary effort is normal       Breath sounds: Normal breath sounds  Abdominal:      General: Bowel sounds are normal       Palpations: Abdomen is soft  Musculoskeletal:         General: No deformity  Normal range of motion  Cervical back: Normal range of motion and neck supple  Skin:     General: Skin is warm and dry  Capillary Refill: Capillary refill takes less than 2 seconds  Neurological:      General: No focal deficit present  Mental Status: He is alert and oriented to person, place, and time  Mental status is at baseline  Psychiatric:         Behavior: Behavior normal          Thought Content: Thought content normal          Judgment: Judgment normal            Medical Decision Makin  Acute viral URI:  Chest x-ray screen for pneumonia; COVID screen    No results found for this or any previous visit (from the past 39 hour(s))  XR chest portable   ED Interpretation   No focal infiltrate            Portions of the record may have been created with voice recognition software  Occasional wrong word or "sound a like" substitutions may have occurred due to the inherent limitations of voice recognition software  Read the chart carefully and recognize, using context, where substitutions have occurred          Critical Care Time  Procedures

## 2021-09-18 NOTE — ED PROVIDER NOTES
History  Chief Complaint   Patient presents with    Vomiting     Pt reports vomiting, sore throat, cold symptoms; pt states daughter was sick recently     72-year-old male presenting for evaluation of approximately 3 days of cold symptoms  Describes runny nose, slight sore throat  Patient did have episodes of vomiting today and yesterday  Daughter have been sick with similar symptoms at home but is now resolved  He came in tonight to get COVID-19 testing  Denies any headaches, neck pain, chest pain, sob  Does have a cough which is mildly productive of whitish sputum  No abdominal pain with his vomiting  No urinary symptoms  ROS otherwise neg as below  History provided by:  Patient   used: No    Vomiting  Associated symptoms: chills, cough and sore throat    Associated symptoms: no abdominal pain, no arthralgias, no diarrhea and no fever        Prior to Admission Medications   Prescriptions Last Dose Informant Patient Reported? Taking? cetirizine (ZyrTEC) 5 MG tablet   Yes No   Sig: Take 5 mg by mouth daily   cyclobenzaprine (FLEXERIL) 10 mg tablet   No No   Sig: Take 1 tablet (10 mg total) by mouth 2 (two) times a day as needed for muscle spasms   naproxen (NAPROSYN) 500 mg tablet   No No   Sig: Take 1 tablet (500 mg total) by mouth 2 (two) times a day with meals      Facility-Administered Medications: None       Past Medical History:   Diagnosis Date    Marijuana use        History reviewed  No pertinent surgical history  History reviewed  No pertinent family history  I have reviewed and agree with the history as documented  E-Cigarette/Vaping     E-Cigarette/Vaping Substances     Social History     Tobacco Use    Smoking status: Never Smoker    Smokeless tobacco: Never Used   Substance Use Topics    Alcohol use: Yes    Drug use: Yes     Types: Marijuana     Comment: Medical marijuana        Review of Systems   Constitutional: Positive for chills   Negative for fatigue and fever  HENT: Positive for congestion, rhinorrhea and sore throat  Eyes: Negative for pain and visual disturbance  Respiratory: Positive for cough  Negative for shortness of breath and wheezing  Cardiovascular: Negative for chest pain and palpitations  Gastrointestinal: Positive for nausea and vomiting  Negative for abdominal pain and diarrhea  Genitourinary: Negative for dysuria and hematuria  Musculoskeletal: Negative for arthralgias and back pain  Skin: Negative for color change and rash  Neurological: Negative for seizures and syncope  Psychiatric/Behavioral: Negative for confusion  The patient is not nervous/anxious  All other systems reviewed and are negative  Physical Exam  ED Triage Vitals [09/14/21 0613]   Temperature Pulse Respirations Blood Pressure SpO2   98 1 °F (36 7 °C) 63 18 112/70 98 %      Temp Source Heart Rate Source Patient Position - Orthostatic VS BP Location FiO2 (%)   Oral Monitor Sitting Left arm --      Pain Score       --             Orthostatic Vital Signs  Vitals:    09/14/21 0613   BP: 112/70   Pulse: 63   Patient Position - Orthostatic VS: Sitting       Physical Exam  Vitals and nursing note reviewed  Constitutional:       Appearance: Normal appearance  He is well-developed  He is not ill-appearing or diaphoretic  HENT:      Head: Normocephalic and atraumatic  Right Ear: Tympanic membrane, ear canal and external ear normal       Left Ear: Tympanic membrane, ear canal and external ear normal       Nose: Nose normal       Mouth/Throat:      Mouth: Mucous membranes are moist       Pharynx: Oropharynx is clear  Eyes:      Conjunctiva/sclera: Conjunctivae normal       Pupils: Pupils are equal, round, and reactive to light  Cardiovascular:      Rate and Rhythm: Normal rate and regular rhythm  Heart sounds: No murmur heard  Pulmonary:      Effort: Pulmonary effort is normal  No respiratory distress        Breath sounds: Normal breath sounds  No wheezing or rales  Abdominal:      General: There is no distension  Palpations: Abdomen is soft  Tenderness: There is no abdominal tenderness  Musculoskeletal:         General: Normal range of motion  Cervical back: Normal range of motion and neck supple  Right lower leg: No edema  Left lower leg: No edema  Skin:     General: Skin is warm and dry  Neurological:      General: No focal deficit present  Mental Status: He is alert  Psychiatric:         Mood and Affect: Mood normal          ED Medications  Medications   ondansetron (ZOFRAN-ODT) dispersible tablet 4 mg (4 mg Oral Given 9/14/21 0630)       Diagnostic Studies  Results Reviewed     Procedure Component Value Units Date/Time    Novel Coronavirus Dandre Perez [facility] [[id number]]  (Normal) Collected: 09/14/21 0630    Lab Status: Final result Specimen: Nares from Nasopharyngeal Swab Updated: 09/14/21 0749     SARS-CoV-2 Negative    Narrative:                        XR chest portable   ED Interpretation by Edwin Manzano MD (09/14 0041)   No focal infiltrate      Final Result by Deepti Rojo MD (09/14 8209)      No acute cardiopulmonary disease  Workstation performed: [id number]               Procedures  Procedures      ED Course                                       [facility]  Number of Diagnoses or Management Options  Nausea and vomiting  Viral URI  Diagnosis management comments: 68-year-old male presenting for evaluation of URI symptoms  Testing COVID-19 testing  Will send COVID swab  Chest x-ray does not show any infiltrate  Discussed supportive care for viral infection at home  Discharge        Disposition  Final diagnoses:   Viral URI   Nausea and vomiting     Time reflects when diagnosis was documented in both MDM as applicable and the Disposition within this note     Time User Action Codes Description Comment    9/14/2021  6:57 AM Denis Davis [J06 9] Viral URI     9/14/2021 7:10 AM Rivard, Garland Bence Add [R11 2] Nausea and vomiting       ED Disposition     ED Disposition Condition Date/Time Comment    Discharge Good [place] Sep 14, 2021  6:57 AM Lilia Correa discharge to home/self care  Follow-up Information     Follow up With Specialties Details Why Contact Info Additional Information    Dani Owens, DO Family Medicine Schedule an appointment as soon as possible for a visit in 1 week For reevaluation as we discussed  3 Mike Chaudhary  [address] Internal Medicine Schedule an appointment as soon as possible for a visit  For reevaluation as we discussed  [address] [facility] [address]  [facility] [address], [address] [facility] [address], [address], [place], [place], [address] [address] [facility] Emergency Department Emergency Medicine Go to  As needed [address]  [address] [facility] [address] Emergency Department, [address], [place], [place], [address]          Discharge Medication List as of 9/14/2021  7:11 AM      CONTINUE these medications which have NOT CHANGED    Details   cetirizine (ZyrTEC) 5 MG tablet Take 5 mg by mouth daily, Historical Med      cyclobenzaprine (FLEXERIL) 10 mg tablet Take 1 tablet (10 mg total) by mouth 2 (two) times a day as needed for muscle spasms, Starting Sat 8/29/2020, Normal      naproxen (NAPROSYN) 500 mg tablet Take 1 tablet (500 mg total) by mouth 2 (two) times a day with meals, Starting Sat 8/29/2020, Normal           No discharge procedures on file  PDMP Review     None           ED Provider  Attending physically available and evaluated Lilia Correa  I managed the patient along with the ED Attending      Electronically Signed by         Jesse Markham MD  09/17/21 0291

## 2021-11-16 PROBLEM — M00.9 PYOGENIC ARTHRITIS OF RIGHT SHOULDER REGION (HCC): Status: ACTIVE | Noted: 2021-11-16

## 2021-11-16 PROBLEM — A54.86 DGI (DISSEMINATED GONOCOCCAL INFECTION) (HCC): Status: ACTIVE | Noted: 2021-11-16

## 2021-11-17 ENCOUNTER — OFFICE VISIT (OUTPATIENT)
Dept: INTERNAL MEDICINE CLINIC | Facility: CLINIC | Age: 36
End: 2021-11-17

## 2021-11-17 VITALS
TEMPERATURE: 98 F | HEART RATE: 61 BPM | HEIGHT: 68 IN | WEIGHT: 142 LBS | DIASTOLIC BLOOD PRESSURE: 72 MMHG | SYSTOLIC BLOOD PRESSURE: 121 MMHG | BODY MASS INDEX: 21.52 KG/M2
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
## 2021-11-17 DIAGNOSIS — Z11.4 SCREENING FOR HIV (HUMAN IMMUNODEFICIENCY VIRUS): ICD-10-CM
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
## 2021-11-17 DIAGNOSIS — Z11.59 NEED FOR HEPATITIS C SCREENING TEST: Primary | ICD-10-CM

## 2021-11-17 DIAGNOSIS — S81.852S DOG BITE OF LEFT LOWER LEG WITH INFECTION, SEQUELA: ICD-10-CM

## 2021-11-17 DIAGNOSIS — S31.815S DOG BITE OF BUTTOCK, RIGHT, SEQUELA: ICD-10-CM

## 2021-11-17 DIAGNOSIS — E55.9 VITAMIN D DEFICIENCY: ICD-10-CM

## 2021-11-17 DIAGNOSIS — L08.9 DOG BITE OF LEFT LOWER LEG WITH INFECTION, SEQUELA: ICD-10-CM

## 2021-11-17 DIAGNOSIS — Z87.898 HX OF URINARY FREQUENCY: ICD-10-CM

## 2021-11-17 DIAGNOSIS — Z00.00 HEALTH CARE MAINTENANCE: ICD-10-CM

## 2021-11-17 PROCEDURE — 99203 OFFICE O/P NEW LOW 30 MIN: CPT | Performed by: INTERNAL MEDICINE

## 2021-11-17 RX ORDER — GABAPENTIN 300 MG/1
300 CAPSULE ORAL
Qty: 90 CAPSULE | Refills: 3 | Status: SHIPPED | OUTPATIENT
Start: 2021-11-17 | End: 2022-01-07

## 2021-11-17 RX ORDER — MULTIVITAMIN
1 TABLET ORAL DAILY
Qty: 90 TABLET | Refills: 3 | Status: SHIPPED | OUTPATIENT
Start: 2021-11-17

## 2021-12-01 ENCOUNTER — EVALUATION (OUTPATIENT)
Dept: PHYSICAL THERAPY | Facility: REHABILITATION | Age: 36
End: 2021-12-01
Payer: COMMERCIAL

## 2021-12-01 DIAGNOSIS — L08.9 DOG BITE OF LEFT LOWER LEG WITH INFECTION, SEQUELA: ICD-10-CM
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
## 2021-12-01 DIAGNOSIS — S31.815S DOG BITE OF BUTTOCK, RIGHT, SEQUELA: ICD-10-CM
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
## 2021-12-01 DIAGNOSIS — G89.29 CHRONIC RIGHT HIP PAIN: ICD-10-CM

## 2021-12-01 DIAGNOSIS — M79.605 LEFT LEG PAIN: Primary | ICD-10-CM

## 2021-12-01 DIAGNOSIS — M25.551 CHRONIC RIGHT HIP PAIN: ICD-10-CM

## 2021-12-01 DIAGNOSIS — S81.852S DOG BITE OF LEFT LOWER LEG WITH INFECTION, SEQUELA: ICD-10-CM

## 2021-12-01 PROCEDURE — 97162 PT EVAL MOD COMPLEX 30 MIN: CPT | Performed by: PHYSICAL THERAPIST

## 2021-12-01 PROCEDURE — 97110 THERAPEUTIC EXERCISES: CPT | Performed by: PHYSICAL THERAPIST

## 2021-12-07 ENCOUNTER — TELEPHONE (OUTPATIENT)
Dept: INTERNAL MEDICINE CLINIC | Facility: CLINIC | Age: 36
End: 2021-12-07

## 2021-12-07 ENCOUNTER — OFFICE VISIT (OUTPATIENT)
Dept: PHYSICAL THERAPY | Facility: REHABILITATION | Age: 36
End: 2021-12-07
Payer: COMMERCIAL
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
## 2021-12-07 DIAGNOSIS — G89.29 CHRONIC RIGHT HIP PAIN: ICD-10-CM

## 2021-12-07 DIAGNOSIS — M79.605 LEFT LEG PAIN: ICD-10-CM

## 2021-12-07 DIAGNOSIS — L08.9 DOG BITE OF LEFT LOWER LEG WITH INFECTION, SEQUELA: ICD-10-CM

## 2021-12-07 DIAGNOSIS — M25.551 CHRONIC RIGHT HIP PAIN: ICD-10-CM
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
## 2021-12-07 DIAGNOSIS — S81.852S DOG BITE OF LEFT LOWER LEG WITH INFECTION, SEQUELA: ICD-10-CM

## 2021-12-07 DIAGNOSIS — S31.815S DOG BITE OF BUTTOCK, RIGHT, SEQUELA: Primary | ICD-10-CM

## 2021-12-07 PROCEDURE — 97110 THERAPEUTIC EXERCISES: CPT

## 2021-12-07 PROCEDURE — 97112 NEUROMUSCULAR REEDUCATION: CPT

## 2021-12-09 ENCOUNTER — OFFICE VISIT (OUTPATIENT)
Dept: PHYSICAL THERAPY | Facility: REHABILITATION | Age: 36
End: 2021-12-09
Payer: COMMERCIAL

## 2021-12-09 DIAGNOSIS — M79.605 LEFT LEG PAIN: ICD-10-CM
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
## 2021-12-09 DIAGNOSIS — L08.9 DOG BITE OF LEFT LOWER LEG WITH INFECTION, SEQUELA: ICD-10-CM

## 2021-12-09 DIAGNOSIS — M25.551 CHRONIC RIGHT HIP PAIN: Primary | ICD-10-CM

## 2021-12-09 DIAGNOSIS — G89.29 CHRONIC RIGHT HIP PAIN: Primary | ICD-10-CM

## 2021-12-09 DIAGNOSIS — S31.815S DOG BITE OF BUTTOCK, RIGHT, SEQUELA: ICD-10-CM
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
## 2021-12-09 DIAGNOSIS — S81.852S DOG BITE OF LEFT LOWER LEG WITH INFECTION, SEQUELA: ICD-10-CM

## 2021-12-09 PROCEDURE — 97110 THERAPEUTIC EXERCISES: CPT | Performed by: PHYSICAL THERAPIST

## 2021-12-09 PROCEDURE — 97112 NEUROMUSCULAR REEDUCATION: CPT | Performed by: PHYSICAL THERAPIST

## 2021-12-13 ENCOUNTER — APPOINTMENT (OUTPATIENT)
Dept: PHYSICAL THERAPY | Facility: REHABILITATION | Age: 36
End: 2021-12-13
Payer: COMMERCIAL

## 2021-12-13 ENCOUNTER — OFFICE VISIT (OUTPATIENT)
Dept: PHYSICAL THERAPY | Facility: REHABILITATION | Age: 36
End: 2021-12-13
Payer: COMMERCIAL

## 2021-12-13 DIAGNOSIS — M25.551 CHRONIC RIGHT HIP PAIN: Primary | ICD-10-CM

## 2021-12-13 DIAGNOSIS — S31.815S DOG BITE OF BUTTOCK, RIGHT, SEQUELA: ICD-10-CM

## 2021-12-13 DIAGNOSIS — M79.605 LEFT LEG PAIN: ICD-10-CM
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
## 2021-12-13 DIAGNOSIS — G89.29 CHRONIC RIGHT HIP PAIN: Primary | ICD-10-CM

## 2021-12-13 DIAGNOSIS — S81.852S DOG BITE OF LEFT LOWER LEG WITH INFECTION, SEQUELA: ICD-10-CM

## 2021-12-13 DIAGNOSIS — L08.9 DOG BITE OF LEFT LOWER LEG WITH INFECTION, SEQUELA: ICD-10-CM

## 2021-12-13 PROCEDURE — 97140 MANUAL THERAPY 1/> REGIONS: CPT

## 2021-12-13 PROCEDURE — 97112 NEUROMUSCULAR REEDUCATION: CPT

## 2021-12-13 PROCEDURE — 97110 THERAPEUTIC EXERCISES: CPT

## 2021-12-15 ENCOUNTER — OFFICE VISIT (OUTPATIENT)
Dept: PHYSICAL THERAPY | Facility: REHABILITATION | Age: 36
End: 2021-12-15
Payer: COMMERCIAL

## 2021-12-15 DIAGNOSIS — S31.815S DOG BITE OF BUTTOCK, RIGHT, SEQUELA: Primary | ICD-10-CM

## 2021-12-15 DIAGNOSIS — S81.852S DOG BITE OF LEFT LOWER LEG WITH INFECTION, SEQUELA: ICD-10-CM

## 2021-12-15 DIAGNOSIS — G89.29 CHRONIC RIGHT HIP PAIN: ICD-10-CM
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
## 2021-12-15 DIAGNOSIS — L08.9 DOG BITE OF LEFT LOWER LEG WITH INFECTION, SEQUELA: ICD-10-CM

## 2021-12-15 DIAGNOSIS — M79.605 LEFT LEG PAIN: ICD-10-CM

## 2021-12-15 DIAGNOSIS — M25.551 CHRONIC RIGHT HIP PAIN: ICD-10-CM

## 2021-12-15 PROCEDURE — 97110 THERAPEUTIC EXERCISES: CPT | Performed by: PHYSICAL THERAPIST

## 2021-12-15 PROCEDURE — 97112 NEUROMUSCULAR REEDUCATION: CPT | Performed by: PHYSICAL THERAPIST

## 2021-12-20 ENCOUNTER — OFFICE VISIT (OUTPATIENT)
Dept: PHYSICAL THERAPY | Facility: REHABILITATION | Age: 36
End: 2021-12-20
Payer: COMMERCIAL
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
## 2021-12-20 DIAGNOSIS — M79.605 LEFT LEG PAIN: ICD-10-CM

## 2021-12-20 DIAGNOSIS — M25.551 CHRONIC RIGHT HIP PAIN: ICD-10-CM

## 2021-12-20 DIAGNOSIS — G89.29 CHRONIC RIGHT HIP PAIN: ICD-10-CM
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
## 2021-12-20 DIAGNOSIS — L08.9 DOG BITE OF LEFT LOWER LEG WITH INFECTION, SEQUELA: ICD-10-CM

## 2021-12-20 DIAGNOSIS — S81.852S DOG BITE OF LEFT LOWER LEG WITH INFECTION, SEQUELA: ICD-10-CM

## 2021-12-20 DIAGNOSIS — S31.815S DOG BITE OF BUTTOCK, RIGHT, SEQUELA: Primary | ICD-10-CM

## 2021-12-20 PROCEDURE — 97112 NEUROMUSCULAR REEDUCATION: CPT

## 2021-12-20 PROCEDURE — 97110 THERAPEUTIC EXERCISES: CPT

## 2021-12-22 ENCOUNTER — OFFICE VISIT (OUTPATIENT)
Dept: PHYSICAL THERAPY | Facility: REHABILITATION | Age: 36
End: 2021-12-22
Payer: COMMERCIAL

## 2021-12-22 DIAGNOSIS — S31.815S DOG BITE OF BUTTOCK, RIGHT, SEQUELA: Primary | ICD-10-CM

## 2021-12-22 DIAGNOSIS — G89.29 CHRONIC RIGHT HIP PAIN: ICD-10-CM

## 2021-12-22 DIAGNOSIS — M25.551 CHRONIC RIGHT HIP PAIN: ICD-10-CM
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
## 2021-12-22 DIAGNOSIS — S81.852S DOG BITE OF LEFT LOWER LEG WITH INFECTION, SEQUELA: ICD-10-CM

## 2021-12-22 DIAGNOSIS — L08.9 DOG BITE OF LEFT LOWER LEG WITH INFECTION, SEQUELA: ICD-10-CM
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
## 2021-12-22 DIAGNOSIS — M79.605 LEFT LEG PAIN: ICD-10-CM

## 2021-12-22 PROCEDURE — 97112 NEUROMUSCULAR REEDUCATION: CPT

## 2021-12-22 PROCEDURE — 97110 THERAPEUTIC EXERCISES: CPT

## 2021-12-22 PROCEDURE — 97140 MANUAL THERAPY 1/> REGIONS: CPT

## 2021-12-27 ENCOUNTER — OFFICE VISIT (OUTPATIENT)
Dept: PHYSICAL THERAPY | Facility: REHABILITATION | Age: 36
End: 2021-12-27
Payer: COMMERCIAL

## 2021-12-27 DIAGNOSIS — S81.852S DOG BITE OF LEFT LOWER LEG WITH INFECTION, SEQUELA: ICD-10-CM

## 2021-12-27 DIAGNOSIS — G89.29 CHRONIC RIGHT HIP PAIN: ICD-10-CM

## 2021-12-27 DIAGNOSIS — S31.815S DOG BITE OF BUTTOCK, RIGHT, SEQUELA: Primary | ICD-10-CM
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
## 2021-12-27 DIAGNOSIS — M25.551 CHRONIC RIGHT HIP PAIN: ICD-10-CM

## 2021-12-27 DIAGNOSIS — L08.9 DOG BITE OF LEFT LOWER LEG WITH INFECTION, SEQUELA: ICD-10-CM

## 2021-12-27 DIAGNOSIS — M79.605 LEFT LEG PAIN: ICD-10-CM

## 2021-12-27 PROCEDURE — 97140 MANUAL THERAPY 1/> REGIONS: CPT

## 2021-12-27 PROCEDURE — 97110 THERAPEUTIC EXERCISES: CPT

## 2021-12-27 PROCEDURE — 97112 NEUROMUSCULAR REEDUCATION: CPT

## 2021-12-29 ENCOUNTER — APPOINTMENT (OUTPATIENT)
Dept: PHYSICAL THERAPY | Facility: REHABILITATION | Age: 36
End: 2021-12-29
Payer: COMMERCIAL

## 2021-12-30 ENCOUNTER — OFFICE VISIT (OUTPATIENT)
Dept: PHYSICAL THERAPY | Facility: REHABILITATION | Age: 36
End: 2021-12-30
Payer: COMMERCIAL
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
## 2021-12-30 DIAGNOSIS — S31.815S DOG BITE OF BUTTOCK, RIGHT, SEQUELA: Primary | ICD-10-CM

## 2021-12-30 DIAGNOSIS — M79.605 LEFT LEG PAIN: ICD-10-CM

## 2021-12-30 DIAGNOSIS — L08.9 DOG BITE OF LEFT LOWER LEG WITH INFECTION, SEQUELA: ICD-10-CM

## 2021-12-30 DIAGNOSIS — M25.551 CHRONIC RIGHT HIP PAIN: ICD-10-CM

## 2021-12-30 DIAGNOSIS — G89.29 CHRONIC RIGHT HIP PAIN: ICD-10-CM
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
## 2021-12-30 DIAGNOSIS — S81.852S DOG BITE OF LEFT LOWER LEG WITH INFECTION, SEQUELA: ICD-10-CM

## 2021-12-30 PROCEDURE — 97140 MANUAL THERAPY 1/> REGIONS: CPT

## 2021-12-30 PROCEDURE — 97110 THERAPEUTIC EXERCISES: CPT

## 2021-12-30 PROCEDURE — 97112 NEUROMUSCULAR REEDUCATION: CPT

## 2022-01-03 ENCOUNTER — OFFICE VISIT (OUTPATIENT)
Dept: PHYSICAL THERAPY | Facility: REHABILITATION | Age: 37
End: 2022-01-03
Payer: COMMERCIAL

## 2022-01-03 DIAGNOSIS — G89.29 CHRONIC RIGHT HIP PAIN: ICD-10-CM

## 2022-01-03 DIAGNOSIS — S31.815S DOG BITE OF BUTTOCK, RIGHT, SEQUELA: Primary | ICD-10-CM

## 2022-01-03 DIAGNOSIS — L08.9 DOG BITE OF LEFT LOWER LEG WITH INFECTION, SEQUELA: ICD-10-CM

## 2022-01-03 DIAGNOSIS — S81.852S DOG BITE OF LEFT LOWER LEG WITH INFECTION, SEQUELA: ICD-10-CM

## 2022-01-03 DIAGNOSIS — M79.605 LEFT LEG PAIN: ICD-10-CM
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
## 2022-01-03 DIAGNOSIS — M25.551 CHRONIC RIGHT HIP PAIN: ICD-10-CM
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
## 2022-01-03 PROCEDURE — 97112 NEUROMUSCULAR REEDUCATION: CPT

## 2022-01-03 PROCEDURE — 97140 MANUAL THERAPY 1/> REGIONS: CPT

## 2022-01-03 PROCEDURE — 97110 THERAPEUTIC EXERCISES: CPT

## 2022-01-05 ENCOUNTER — OFFICE VISIT (OUTPATIENT)
Dept: PHYSICAL THERAPY | Facility: REHABILITATION | Age: 37
End: 2022-01-05
Payer: COMMERCIAL
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
## 2022-01-05 DIAGNOSIS — M25.551 CHRONIC RIGHT HIP PAIN: ICD-10-CM

## 2022-01-05 DIAGNOSIS — S31.815S DOG BITE OF BUTTOCK, RIGHT, SEQUELA: ICD-10-CM

## 2022-01-05 DIAGNOSIS — G89.29 CHRONIC RIGHT HIP PAIN: ICD-10-CM

## 2022-01-05 DIAGNOSIS — L08.9 DOG BITE OF LEFT LOWER LEG WITH INFECTION, SEQUELA: ICD-10-CM
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
## 2022-01-05 DIAGNOSIS — M79.605 LEFT LEG PAIN: Primary | ICD-10-CM

## 2022-01-05 DIAGNOSIS — S81.852S DOG BITE OF LEFT LOWER LEG WITH INFECTION, SEQUELA: ICD-10-CM

## 2022-01-05 PROCEDURE — 97112 NEUROMUSCULAR REEDUCATION: CPT

## 2022-01-05 PROCEDURE — 97110 THERAPEUTIC EXERCISES: CPT

## 2022-01-05 NOTE — PROGRESS NOTES
Daily Note     Today's date: 2022  Patient name: Guillermina Long  : 1985  MRN: [id number]  Referring provider: Gilson Francisco DO  Dx:   Encounter Diagnosis     ICD-10-CM    1  Left leg pain  M79 605    2  Dog bite of buttock, right, sequela  S31 815S     W54  0XXS    3  Dog bite of left lower leg with infection, sequela  S81 852S     L08 9     W54  0XXS    4  Chronic right hip pain  M25 551     G89 29                   Subjective: Pt reports he is continuing to feel better  He reports having soreness after previous PT session, but "a good soreness" from IASTM  Pt notes overall he's been able to move his ankle more  Objective: See treatment diary below      Assessment: Pt tolerated treatment well  Continued to encourage pt to perform desenitization techniques at home over bites with towel or pant leg  Pt in agreement, and able to demonstrate  Increase weight bearing volume as appropriate  1:1 with Nikki Antunez DPT for entirety of tx  Plan: Continue per plan of care        Precautions: multiple dog bites 2 years ago; high irritability; marijuana use       Manuals 12/1 12/7 12/9 12/13 12/15 12/20 12/22 12/27 12/30 1/3 1/5   R hip PROM              L ankle PROM    Attemtped TB; inv/ev attempted; extremely guarded  CM   PF, inv CM PF, inv CM DF, PF, inv np    L ankle MREs              IASTM         L lat ankle L lat ankle L lat ankle and gastroc   L ankle isometrics    5"x3 ea 3x5; 3s ea 3"x5 ea 3"x5 ea 3"x5 ea 3"x5 ea 3"x5 ea 3"x5 ea   Neuro Re-Ed              Toe Curls/Spreads 2x30             Short Foot Holds              Prone hip ext      2x10 R 2x10 R 2x10 R 5# knee bent 2x10 R knee bent 2x10 R knee bent 2x10 R knee bent   Bridges   3x10 w/ gtb abd 3x10 w/ gtb abd 3x10 w/ gtb abd 3x10 w/ btb abd        Clamshells              Hip CAR              Table hip 3 way  10x ea R 2x10  2x10 ea B 3x10 ea B 2x10 ea B 2x10 ea B dc      Mini squats       2x10 2x10 dc     SLS    30"x2 R, attempted L Ther Ex              Nustep  8' L3 8' L3 8' L3 8 min L4 np        Ankle circles  30x each 30x ea 2x30 x30 30x  30x 30x 30x 30x np    BAPS seated  10x a/p m/l 1' each 1' ea 2x1 min ea; a/p; m/l np   2x 1 min ea; a/p; m/l 2x 1 min ea; a/p; m/l 2x 1 min ea; a/p; m/l   4 way ankle              Seated heel raises/toe raises  10x 10x x10 2x10 ea 2x10 ea  2x10 ea  2x10 ea 3x10 ea   Sidestepping              Sit to Stands  10x 2x10 - staggered L back 2x10 - staggered L back 2x10 - staggered L back 2x10 - staggered L back        DF towel stretch       20"x3 L 20"x3 L 30"x3 L 30"x3 L 30"x3 L   Standing gastroc stretch    30"x2 L 4x20s L 20"x3 L 20"x3 L 20"x3 L      Supine KTC        30"x3 R 30"x3 R 30"x3 R 30"x4 R   Ther Activity              Step ups        x10 B 4"                    Gait Training              Alter G                            Modalities

## 2022-01-06 PROBLEM — E55.9 VITAMIN D DEFICIENCY: Status: ACTIVE | Noted: 2022-01-06

## 2022-01-06 PROBLEM — M79.2 NEUROPATHIC PAIN OF LOWER EXTREMITY, LEFT: Status: ACTIVE | Noted: 2022-01-06

## 2022-01-07 ENCOUNTER — OFFICE VISIT (OUTPATIENT)
Dept: INTERNAL MEDICINE CLINIC | Facility: CLINIC | Age: 37
End: 2022-01-07

## 2022-01-07 VITALS
WEIGHT: 146.8 LBS | HEART RATE: 74 BPM | DIASTOLIC BLOOD PRESSURE: 67 MMHG | SYSTOLIC BLOOD PRESSURE: 115 MMHG | TEMPERATURE: 97.9 F | BODY MASS INDEX: 22.65 KG/M2

## 2022-01-07 DIAGNOSIS — L08.9 DOG BITE OF LEFT LOWER LEG WITH INFECTION, SEQUELA: ICD-10-CM

## 2022-01-07 DIAGNOSIS — M79.2 NEUROPATHIC PAIN OF LOWER EXTREMITY, LEFT: Primary | ICD-10-CM

## 2022-01-07 DIAGNOSIS — S81.852S DOG BITE OF LEFT LOWER LEG WITH INFECTION, SEQUELA: ICD-10-CM
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
## 2022-01-07 PROCEDURE — 99213 OFFICE O/P EST LOW 20 MIN: CPT | Performed by: INTERNAL MEDICINE

## 2022-01-07 RX ORDER — DULOXETIN HYDROCHLORIDE 30 MG/1
30 CAPSULE, DELAYED RELEASE ORAL DAILY
Qty: 30 CAPSULE | Refills: 0 | Status: SHIPPED | OUTPATIENT
Start: 2022-01-07 | End: 2022-01-24 | Stop reason: SDUPTHER

## 2022-01-07 RX ORDER — CETIRIZINE HYDROCHLORIDE 5 MG/1
5 TABLET ORAL
COMMUNITY
End: 2022-05-10 | Stop reason: ALTCHOICE

## 2022-01-07 RX ORDER — GABAPENTIN 300 MG/1
300 CAPSULE ORAL 3 TIMES DAILY
Qty: 270 CAPSULE | Refills: 3 | Status: SHIPPED | OUTPATIENT
Start: 2022-01-07 | End: 2022-03-25 | Stop reason: SDUPTHER

## 2022-01-07 NOTE — PROGRESS NOTES
ASSESSMENT/PLAN:    Case and plan discussed with Dr Vance Young    Diagnoses and all orders for this visit:    Neuropathic pain of lower extremity, left  Poorly controlled pain  with Neurontin once a day  Will increase to 300 mg t i d  And will add duloxetine 30 mg to start 1-2 weeks after taking the gabapentin 3 times a day  Needs follow up 2-4 weeks for titration of regimen  -     DULoxetine (CYMBALTA) 30 mg delayed release capsule; Take 1 capsule (30 mg total) by mouth daily Start taking in 1-2 weeks after taking gabapentin 3 times per day  -     gabapentin (Neurontin) 300 mg capsule; Take 1 capsule (300 mg total) by mouth 3 (three) times a day          Immunization History   Administered Date(s) Administered    COVID-19 MODERNA VACC 0 5 ML IM 06/01/2021    Rabies 09/07/2019, 09/11/2019, 09/14/2019, 09/21/2019    Tdap 09/07/2019         HISTORY OF PRESENT ILLNESS:    Presents for evaluation of neuropathic pain in the left lower extremity as sequela of dog bite in 08/2019  He has been receiving physical therapy and has been experiencing severe pain in his left lower leg after therapy sessions  He has been using Neurontin 300 mg at bedtime however this is not sufficient to control his pain  Review of Systems   Constitutional: Negative for appetite change, diaphoresis, fatigue and fever  HENT: Negative for rhinorrhea and sore throat  Eyes: Negative  Negative for visual disturbance  Respiratory: Negative for apnea, cough, choking, chest tightness and shortness of breath  Cardiovascular: Negative for chest pain, palpitations and leg swelling  Gastrointestinal: Negative for abdominal distention, abdominal pain, constipation, diarrhea, nausea and vomiting  Endocrine: Negative  Genitourinary: Negative  Musculoskeletal: Negative  Pain in left lower leg and foot    Skin: Negative  Neurological: Negative for dizziness and headaches  Hematological: Negative      Psychiatric/Behavioral: Negative  OBJECTIVE:  Vitals:    01/07/22 0941   BP: 115/67   BP Location: Right arm   Patient Position: Sitting   Cuff Size: Standard   Pulse: 74   Temp: 97 9 °F (36 6 °C)   TempSrc: Temporal   Weight: 66 6 kg (146 lb 12 8 oz)       Physical Exam:   GENERAL: NAD, Normal appearance  Non diaphoretic, non-toxic, not ill-appearing, well-developed, well-nourished  NEUROLOGIC:  Alert/oriented x3  No motor or sensory deficits  HEENT:  NC/AT, PERRL, EOMI, MMM, no scleral icterus  CARDIAC:  RRR, +S1/S2, no S3/S4 heard, no m/g/r  PULMONARY:  non-labored breathing, CTA B/L, no wheezing/rales/rhonci appreciated at time of encounter  ABDOMEN:  Soft, NT/ND, +BS, no rebound/guarding/rigidity  Extremities:  2+ Pulses in DP/PT  No edema, cyanosis, or clubbing  Decreased sensation left lower leg and foot   SKIN:  No rashes or erythema        Current Outpatient Medications:     gabapentin (Neurontin) 300 mg capsule, Take 1 capsule (300 mg total) by mouth daily at bedtime, Disp: 90 capsule, Rfl: 3    Multiple Vitamin (multivitamin) tablet, Take 1 tablet by mouth daily, Disp: 90 tablet, Rfl: 3    cetirizine (ZyrTEC) 5 MG tablet, Take 5 mg by mouth (Patient not taking: Reported on 1/7/2022 ), Disp: , Rfl:     Past Medical History:   Diagnosis Date    Marijuana use      History reviewed  No pertinent surgical history    Family History   Problem Relation Age of Onset    No Known Problems Mother     No Known Problems Daughter      Social History     Socioeconomic History    Marital status: Single     Spouse name: Not on file    Number of children: 3    Years of education: Not on file    Highest education level: Not on file   Occupational History    Occupation: unemployed   Tobacco Use    Smoking status: Never Smoker    Smokeless tobacco: Never Used   Vaping Use    Vaping Use: Never used   Substance and Sexual Activity    Alcohol use: Yes     Comment: occasionally    Drug use: Yes     Types: Marijuana     Comment: Medical marijuana    Sexual activity: Yes     Partners: Female     Birth control/protection: Condom Male     Comment: 1 partner only at this time   Other Topics Concern    Not on file   Social History Narrative    Not on file     Social Determinants of Health     Financial Resource Strain: Medium Risk    Difficulty of Paying Living Expenses: Somewhat hard   Food Insecurity: No Food Insecurity    Worried About Running Out of Food in the Last Year: Never true    [place] of Food in the Last Year: Never true   Transportation Needs: No Transportation Needs    Lack of Transportation (Medical): No    Lack of Transportation (Non-Medical):  No   Physical Activity: Insufficiently Active    Days of Exercise per Week: 2 days    Minutes of Exercise per Session: 60 min   Stress: Not on file   Social Connections: Not on file   Intimate Partner Violence: Not on file   Housing Stability: Low Risk     Unable to Pay for Housing in the Last Year: No    Number of Places Lived in the Last Year: 1    Unstable Housing in the Last Year: No     Social History     Tobacco Use   Smoking Status Never Smoker   Smokeless Tobacco Never Used     Social History     Substance and Sexual Activity   Alcohol Use Yes    Comment: occasionally     Social History     Substance and Sexual Activity   Drug Use Yes    Types: Marijuana    Comment: Medical marijuana         Joceline Badillo MD  Internal Medicine Residency, PGY-3  [facility]

## 2022-01-10 ENCOUNTER — OFFICE VISIT (OUTPATIENT)
Dept: PHYSICAL THERAPY | Facility: REHABILITATION | Age: 37
End: 2022-01-10
Payer: COMMERCIAL

## 2022-01-10 DIAGNOSIS — M25.551 CHRONIC RIGHT HIP PAIN: ICD-10-CM

## 2022-01-10 DIAGNOSIS — M79.605 LEFT LEG PAIN: Primary | ICD-10-CM

## 2022-01-10 DIAGNOSIS — G89.29 CHRONIC RIGHT HIP PAIN: ICD-10-CM

## 2022-01-10 PROCEDURE — 97112 NEUROMUSCULAR REEDUCATION: CPT | Performed by: PHYSICAL THERAPIST

## 2022-01-10 PROCEDURE — 97110 THERAPEUTIC EXERCISES: CPT | Performed by: PHYSICAL THERAPIST

## 2022-01-12 ENCOUNTER — OFFICE VISIT (OUTPATIENT)
Dept: PHYSICAL THERAPY | Facility: REHABILITATION | Age: 37
End: 2022-01-12
Payer: COMMERCIAL

## 2022-01-12 DIAGNOSIS — S31.815S DOG BITE OF BUTTOCK, RIGHT, SEQUELA: ICD-10-CM
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
## 2022-01-12 DIAGNOSIS — L08.9 DOG BITE OF LEFT LOWER LEG WITH INFECTION, SEQUELA: ICD-10-CM

## 2022-01-12 DIAGNOSIS — G89.29 CHRONIC RIGHT HIP PAIN: ICD-10-CM

## 2022-01-12 DIAGNOSIS — M79.605 LEFT LEG PAIN: Primary | ICD-10-CM

## 2022-01-12 DIAGNOSIS — M25.551 CHRONIC RIGHT HIP PAIN: ICD-10-CM

## 2022-01-12 DIAGNOSIS — S81.852S DOG BITE OF LEFT LOWER LEG WITH INFECTION, SEQUELA: ICD-10-CM

## 2022-01-12 PROCEDURE — 97140 MANUAL THERAPY 1/> REGIONS: CPT

## 2022-01-12 PROCEDURE — 97110 THERAPEUTIC EXERCISES: CPT

## 2022-01-12 NOTE — PROGRESS NOTES
Daily Note     Today's date: 2022  Patient name: Rosa Maria Matthews  : 1985  MRN: [id number]  Referring provider: Palomo Velez DO  Dx:   Encounter Diagnosis     ICD-10-CM    1  Left leg pain  M79 605    2  Chronic right hip pain  M25 551     G89 29    3  Dog bite of buttock, right, sequela  S31 815S     W54  0XXS    4  Dog bite of left lower leg with infection, sequela  S81 852S     L08 9     W54  0XXS                   Subjective: Pt reports he had increased soreness following last session, but felt better the next day  Objective: See treatment diary below      Assessment: Tolerated treatment fair  Pt continues to have high pain levels, limiting throughout ROM passively and actively  Continues to demonstrate improved ROM, but has soreness following tx  Demonstrated improved strength during isometrics  Encouraged pt to continue using heat at home PRN for relief  Patient would benefit from continued PT  1:1 with Dorothy Arvizu DPT for entirety of tx  Plan: Continue per plan of care        Precautions: multiple dog bites 2 years ago; high irritability; marijuana use       Manuals 1/10 1/12            R hip PROM              L ankle PROM              L ankle MREs              IASTM L lat ankle and gastroc 10'   L lat ankle and gastroc 10'             L ankle isometrics 3x5 5"x5 ea            Neuro Re-Ed              Toe Curls/Spreads 1'x2  np            Short Foot Holds              Prone hip ext              Bridges              Clamshells              Hip CAR              Table hip 3 way              Mini squats              SLS              Ther Ex              Nustep              Ankle circles               BAPS seated 2x1' ea ap/ml 2x1' ea ap/ml            4 way ankle              Seated heel raises/toe raises 2x1'  2x1'            Sidestepping              Sit to Stands 3x8 HL table 2x15 HL table            Active straight leg raise  2x5 np            Standing gastroc stretch manual 2' manual 2'            Ilda bueno  1'x2  np            Ther Activity              Step ups                            Gait Training              Alter G                            Modalities

## 2022-01-17 ENCOUNTER — APPOINTMENT (OUTPATIENT)
Dept: PHYSICAL THERAPY | Facility: REHABILITATION | Age: 37
End: 2022-01-17
Payer: COMMERCIAL

## 2022-01-19 ENCOUNTER — OFFICE VISIT (OUTPATIENT)
Dept: PHYSICAL THERAPY | Facility: REHABILITATION | Age: 37
End: 2022-01-19
Payer: COMMERCIAL

## 2022-01-19 DIAGNOSIS — S31.815S DOG BITE OF BUTTOCK, RIGHT, SEQUELA: ICD-10-CM

## 2022-01-19 DIAGNOSIS — M79.605 LEFT LEG PAIN: Primary | ICD-10-CM
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
## 2022-01-19 DIAGNOSIS — G89.29 CHRONIC RIGHT HIP PAIN: ICD-10-CM

## 2022-01-19 DIAGNOSIS — M25.551 CHRONIC RIGHT HIP PAIN: ICD-10-CM

## 2022-01-19 DIAGNOSIS — S81.852S DOG BITE OF LEFT LOWER LEG WITH INFECTION, SEQUELA: ICD-10-CM

## 2022-01-19 DIAGNOSIS — L08.9 DOG BITE OF LEFT LOWER LEG WITH INFECTION, SEQUELA: ICD-10-CM
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
## 2022-01-19 PROCEDURE — 97140 MANUAL THERAPY 1/> REGIONS: CPT

## 2022-01-19 PROCEDURE — 97110 THERAPEUTIC EXERCISES: CPT

## 2022-01-19 NOTE — PROGRESS NOTES
Daily Note     Today's date: 2022  Patient name: Aleida Pereyra  : 1985  MRN: [id number]  Referring provider: Patti Lazo DO  Dx:   Encounter Diagnosis     ICD-10-CM    1  Left leg pain  M79 605    2  Chronic right hip pain  M25 551     G89 29    3  Dog bite of buttock, right, sequela  S31 815S     W54  0XXS    4  Dog bite of left lower leg with infection, sequela  S81 852S     L08 9     W54  0XXS                   Subjective: Pt reports he is feeling better today due to extra rest  Since pt canceled last PT session, pt notes he was performing exercises at home with heating pad  Pt reports pain is overall better controled recently due to rest and new medication  Objective: See treatment diary below      Assessment: Tolerated treatment well  Pt able to demonstrate improved AROM into PF and DF, and able to tolerate progression of strengthening with yellow tband  Held A-P BAPS board due to improved ROM and ability to get board to touch floor  Pt continues to have high pain levels throughout session, but overall was better controled than previous session  Encouraged pt to continue performing STM to bite sites on L lower leg  Pt in agreement  Monitor response at f/u  Patient would benefit from continued PT  1:1 with Carlo Mathis DPT for entirety of tx  Plan: Continue per plan of care        Precautions: multiple dog bites 2 years ago; high irritability; marijuana use       Manuals 1/10 1/12 1/19           R hip PROM              L ankle PROM              L ankle MREs              IASTM L lat ankle and gastroc 10'   L lat ankle and gastroc 10'  L lat ankle and gastroc 10'            L ankle isometrics 3x5 5"x5 ea 5"x5 ea           Neuro Re-Ed              Toe Curls/Spreads 1'x2  np 2x1'           Short Foot Holds              Prone hip ext              Bridges              Clamshells              Hip CAR              Table hip 3 way              Mini squats              SLS Ther Ex              Nustep              Ankle circles               BAPS seated 2x1' ea ap/ml 2x1' ea ap/ml 2x1' ML           4 way ankle   DF/DF 3x5 ea ytb           Seated heel raises/toe raises 2x1'  2x1' 2x1'           Sidestepping              Sit to Stands 3x8 HL table 2x15 HL table 3x8 HL table, tap           Active straight leg raise  2x5 np            Standing gastroc stretch manual 2'  manual 2' Manual 2'           Windshield wipers  1'x2  np            Ther Activity              Step ups                            Gait Training              Alter G                            Modalities

## 2022-01-24 ENCOUNTER — OFFICE VISIT (OUTPATIENT)
Dept: INTERNAL MEDICINE CLINIC | Facility: CLINIC | Age: 37
End: 2022-01-24

## 2022-01-24 VITALS
WEIGHT: 141 LBS | SYSTOLIC BLOOD PRESSURE: 128 MMHG | DIASTOLIC BLOOD PRESSURE: 79 MMHG | TEMPERATURE: 98.1 F | HEART RATE: 60 BPM | BODY MASS INDEX: 21.37 KG/M2 | HEIGHT: 68 IN

## 2022-01-24 DIAGNOSIS — M79.2 NEUROPATHIC PAIN OF LOWER EXTREMITY, LEFT: ICD-10-CM

## 2022-01-24 PROCEDURE — 99213 OFFICE O/P EST LOW 20 MIN: CPT | Performed by: HOSPITALIST

## 2022-01-24 RX ORDER — DULOXETIN HYDROCHLORIDE 60 MG/1
60 CAPSULE, DELAYED RELEASE ORAL DAILY
Qty: 90 CAPSULE | Refills: 1 | Status: SHIPPED | OUTPATIENT
Start: 2022-01-24 | End: 2022-05-10 | Stop reason: SDUPTHER

## 2022-01-24 NOTE — PROGRESS NOTES
INTERNAL MEDICINE FOLLOW-UP OFFICE VISIT  [facility]  [address] [place] [address]    NAME: Guillermina Long  AGE: 31 y o  SEX: male    DATE OF ENCOUNTER: 1/24/2022    Assessment and Plan     1  Neuropathic pain of lower extremity, left    Nerve pain after dog bite in 2019  No foot drop however limited ROM due to pain  Improved pain with Cymbalta 30mg and Gabapentin 300mg TID  Follows with physical therapy    Will increase the Cymbalta to 60mg and maintain Gabapentin dose  Will send a acute pain referral for possible long term solution if available    - DULoxetine (CYMBALTA) 60 mg delayed release capsule; Take 1 capsule (60 mg total) by mouth daily Start taking in 1-2 weeks after taking gabapentin 3 times per day  Dispense: 90 capsule; Refill: 1  - Ambulatory Referral to Pain Management; Future    No orders of the defined types were placed in this encounter  Chief Complaint     Chief Complaint   Patient presents with    Follow-up     pain in left calf- spasm       History of Present Illness     HPI    Pt is a 30 y/o M with PMHx of dog bite who presents for a follow up for nerve pain  Pt was seen last visit and was put on Cymbalta 30mg and Gabapentin 300mg qd  Pt has been tolerating the medication well and is helping with the pain  He will continue to follow up with physical therapy and endorsing improvement in range of motion  Does have sensitivity to weather changes and while ambulating  He denies any other symptoms  Denies any chest pain, abdominal pain, nausea, vomiting, fever or chills      The following portions of the patient's history were reviewed and updated as appropriate: allergies, current medications, past family history, past medical history, past social history, past surgical history and problem list     Review of Systems     Review of Systems    Active Problem List     Patient Active Problem List   Diagnosis    [facility]    Pyogenic arthritis of right shoulder region ([facility] )    Neuropathic pain of lower extremity, left    Vitamin D deficiency       Objective     /79 (BP Location: Right arm, Patient Position: Sitting, Cuff Size: Large)   Pulse 60   Temp 98 1 °F (36 7 °C) (Temporal)   Ht 5' 7 5" (1 715 m)   Wt 64 kg (141 lb)   BMI 21 76 kg/m²     Physical Exam  Constitutional:       General: He is not in acute distress  Appearance: He is normal weight  He is not ill-appearing, toxic-appearing or diaphoretic  HENT:      Head: Normocephalic and atraumatic  Nose: No congestion or rhinorrhea  Cardiovascular:      Rate and Rhythm: Normal rate and regular rhythm  Pulses: Normal pulses  Heart sounds: Normal heart sounds  No murmur heard  No friction rub  No gallop  Pulmonary:      Effort: Pulmonary effort is normal  No respiratory distress  Breath sounds: Normal breath sounds  No stridor  No wheezing, rhonchi or rales  Chest:      Chest wall: No tenderness  Abdominal:      General: Abdomen is flat  Palpations: Abdomen is soft  Tenderness: There is no right CVA tenderness or left CVA tenderness  Musculoskeletal:      Right lower leg: No edema  Left lower leg: No edema  Comments: L ankle Limited ROM due to pain  Old Puncture wound on left leg   Neurological:      General: No focal deficit present  Mental Status: He is alert and oriented to person, place, and time  Mental status is at baseline     Psychiatric:         Mood and Affect: Mood normal          Behavior: Behavior normal           Current Medications     Current Outpatient Medications:     DULoxetine (CYMBALTA) 30 mg delayed release capsule, Take 1 capsule (30 mg total) by mouth daily Start taking in 1-2 weeks after taking gabapentin 3 times per day, Disp: 30 capsule, Rfl: 0    gabapentin (Neurontin) 300 mg capsule, Take 1 capsule (300 mg total) by mouth 3 (three) times a day, Disp: 169 capsule, Rfl: 3    Multiple Vitamin (multivitamin) tablet, Take 1 tablet by mouth daily, Disp: 90 tablet, Rfl: 3    cetirizine (ZyrTEC) 5 MG tablet, Take 5 mg by mouth (Patient not taking: Reported on 1/7/2022 ), Disp: , Rfl:     Health Maintenance     Health Maintenance   Topic Date Due    Hepatitis C Screening  Never done    HIV Screening  Never done    Annual Physical  Never done    COVID-19 Vaccine (2 - Moderna 3-dose series) 06/29/2021    PT PLAN OF CARE  12/31/2021    Influenza Vaccine (1) 06/30/2022 (Originally 9/1/2021)    Depression Screening  11/17/2022    BMI: Adult  01/07/2023    DTaP,Tdap,and Td Vaccines (2 - Td or Tdap) 09/07/2029    Pneumococcal Vaccine: Pediatrics (0 to 5 Years) and At-Risk Patients (6 to 59 Years)  Aged Out    HIB Vaccine  Aged Out    Hepatitis B Vaccine  Aged Out    IPV Vaccine  Aged Out    Hepatitis A Vaccine  Aged Out    Meningococcal ACWY Vaccine  Aged Out    HPV Vaccine  Aged [employer] History   Administered Date(s) Administered    COVID-19 MODERNA VACC 0 5 ML IM 06/01/2021    Rabies 09/07/2019, 09/11/2019, 09/14/2019, 09/21/2019    Tdap 09/07/2019       Rowan Craig MD  PGY 2

## 2022-01-26 ENCOUNTER — APPOINTMENT (OUTPATIENT)
Dept: PHYSICAL THERAPY | Facility: REHABILITATION | Age: 37
End: 2022-01-26
Payer: COMMERCIAL

## 2022-01-31 ENCOUNTER — EVALUATION (OUTPATIENT)
Dept: PHYSICAL THERAPY | Facility: REHABILITATION | Age: 37
End: 2022-01-31
Payer: COMMERCIAL

## 2022-01-31 DIAGNOSIS — G89.29 CHRONIC RIGHT HIP PAIN: ICD-10-CM

## 2022-01-31 DIAGNOSIS — L08.9 DOG BITE OF LEFT LOWER LEG WITH INFECTION, SEQUELA: ICD-10-CM

## 2022-01-31 DIAGNOSIS — S31.815S DOG BITE OF BUTTOCK, RIGHT, SEQUELA: ICD-10-CM
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
## 2022-01-31 DIAGNOSIS — S81.852S DOG BITE OF LEFT LOWER LEG WITH INFECTION, SEQUELA: ICD-10-CM

## 2022-01-31 DIAGNOSIS — M79.605 LEFT LEG PAIN: Primary | ICD-10-CM
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
## 2022-01-31 DIAGNOSIS — M25.551 CHRONIC RIGHT HIP PAIN: ICD-10-CM

## 2022-01-31 PROCEDURE — 97110 THERAPEUTIC EXERCISES: CPT

## 2022-01-31 PROCEDURE — 97112 NEUROMUSCULAR REEDUCATION: CPT

## 2022-01-31 PROCEDURE — 97140 MANUAL THERAPY 1/> REGIONS: CPT

## 2022-01-31 NOTE — PROGRESS NOTES
PT Re-Evaluation     Today's date: 2022  Patient name: Anneliese Truong  : 1985  MRN: [id number]  Referring provider: Oscar Villanueva DO  Dx:   Encounter Diagnosis     ICD-10-CM    1  Left leg pain  M79 605    2  Dog bite of buttock, right, sequela  S31 815S Ambulatory referral to Physical Therapy    W54  0XXS    3  Dog bite of left lower leg with infection, sequela  S81 852S Ambulatory referral to Physical Therapy    L08 9     W54  0XXS    4  Chronic right hip pain  M25 551     G89 29                   Assessment  Assessment details: Anneliese Truong is a pleasant 31 y o  male who presents with chronic right buttock pain and left, lower leg pain secondary to multiple dog bites which occurred about 2 years ago  Pt demonstrates improvement in overall pain levels, active ROM, and strength in both R hip and L ankle  Pt continues to be limited secondary to L ankle weakness during functional activity, fear avoidance, and muscle tightness secondary to prolonged immobility due to pain  Pt would continue to benefit from skilled PT to address these, and the below impairments in order to reach pt's goals and facilitate return to functional baseline  Etiologic factors include multiple dog bites  Impairments: abnormal coordination, abnormal gait, abnormal muscle firing, abnormal muscle tone, abnormal or restricted ROM, abnormal movement, activity intolerance, impaired physical strength, lacks appropriate home exercise program, pain with function, scapular dyskinesis, weight-bearing intolerance, poor posture  and poor body mechanics    Symptom irritability: highPrognosis details: Positive prognostic indicators include positive attitude toward recovery  Negative prognostic indicators include chronicity of symptoms, high symptom irritability, multiple concurrent orthopedic problems and marijuana use  Goals  STG  In 4 weeks, pt will:  1  Decrease worst pain by 2 points for improved QOL    2  Report improvement in ambulation tolerance for facilitation of return to functional baseline  LTG  In 8 weeks, pt will:   1  Be independent with home exercise program  - ONGOING  2  Be able to manage symptoms independently  - ONGOING  3  Improve FOTO outcome to higher than predictive value  4  Improve ankle strength by 1 grade for improved ankle functional tolerance  5  Perform SLS on LL for 5 seconds for improved L ankle function  6  Improve DF by 5 degrees for improved tolerance to ambulating on hills  7  Demonstrate HR to reach top shelf in kitchen in order to reach pt goal     Plan  Plan details: 2x/week  Plan duration: 8 weeks  Patient would benefit from: skilled physical therapy  Referral necessary: No  Planned modality interventions: TENS and cryotherapy  Planned therapy interventions: abdominal trunk stabilization, activity modification, balance, balance/weight bearing training, behavior modification, body mechanics training, breathing training, coordination, fine motor coordination training, flexibility, functional ROM exercises, gait training, graded activity, graded exercise, graded motor, home exercise program, joint mobilization, massage, neuromuscular re-education, postural training, patient education, strengthening, stretching, therapeutic activities and therapeutic exercise  Plan of Care beginning date: 1/31/2022  Treatment plan discussed with: patient        Subjective Evaluation    Pt reports he's improved since beginning PT  He notes "everyday household stuff" is better, and walking up hills is less "side to side " Pt reports having more "confidence" with PT  Pt reports occasionally feeling more "tired " Pt reports he's 65% back to baseline  He continues to be limited with most ADLs, long distance ambulation, sitting for long periods due to hip pain, and going up on toes in kitchen to reach items on high shelves       Pain  Quality: discomfort, dull ache, sharp, tight, throbbing and pulling  Relieving factors: rest, relaxation and change in position  Aggravating factors: lifting, running, overhead activity, stair climbing, sitting, standing and walking    Treatments  Previous treatment: massage  Current treatment: physical therapy  Patient Goals  Patient goals for therapy: decreased edema, decreased pain, improved balance, increased motion, increased strength, independence with ADLs/IADLs, return to sport/leisure activities and return to work          Objective     General Comments:      Hip Comments   Right Hip AROM:   Flexion: WFL p! Extension: WFL p! Abduction: WFL p! Adduction: WFL     Right Hip PROM:   Flexion: WFL p! Extension: WFL p! Abduction: WFL p! Adduction: WFL p! Strength:   Hip Flexion: 4+/5 p! Hip Abduction: 4+/5  Hip Extension Knee Extended: 4/5 p! Hip Extension Knee Flexed: 4/5 p! Hip ER at 90 degrees F: 4+/5   Hip IR at 90 degrees F: 4+/5     Palpation: TTP of right gluteal region           Ankle/Foot Comments   Left Ankle AROM:   Dorsiflexion: 75% limited p! Plantarflexion: 50% limited p! Inversion: 75% limited p! Eversion: 75% limited p! Left Ankle PROM:   Dorsiflexion: 50% limited p! Plantarflexion: 50% limited p! Inversion: 75% limited p! Eversion: 75% limited p! Skin Integrity: Scar tissue present at distal, lateral aspect of left lower leg from dog bites     Strength: Left ankle  Dorsiflexion: 3+/5 p! Plantarflexion: 3+/5 p! Inversion: 3/5 p! Eversion: 3/5 p!     Gait: Antalgic, slight weight shift towards right lower extremity     Squat: Poor depth, pain, knee valgus bilaterally, weight shift towards right side     Palpation: Diffuse tenderness of distal lateral aspect of left lower leg              Precautions: multiple dog bites 2 years ago; high irritability; marijuana use       Manuals 1/10 1/12 1/19 1/31          R hip PROM              L ankle PROM              L ankle MREs              IASTM L lat ankle and gastroc 10'   L lat ankle and gastroc 10'  L lat ankle and gastroc 10'            L ankle isometrics 3x5 5"x5 ea 5"x5 ea 5"x5 ea          RE    10'          Neuro Re-Ed              Toe Curls/Spreads 1'x2  np 2x1' 2x1'          Short Foot Holds              Prone hip ext              Bridges              Clamshells              Hip CAR              Table hip 3 way              Mini squats              SLS              Ther Ex              Nustep              Ankle circles               BAPS seated 2x1' ea ap/ml 2x1' ea ap/ml 2x1' ML 2x1' ea ap/ml          4 way ankle   DF/DF 3x5 ea ytb DF/DF x10 ea ytb          Seated heel raises/toe raises 2x1'  2x1' 2x1' 2x1' 10# DB          Sidestepping              Sit to Stands 3x8 HL table 2x15 HL table 3x8 HL table, tap 2x10 chair          Active straight leg raise  2x5 np            Standing gastroc stretch manual 2'  manual 2' Manual 2' Manual 2'          Windshield wipers  1'x2  np            SKTC stretch    30"x3 R hep          Ther Activity              Step ups                            Gait Training              Byron CARPIO                            Modalities

## 2022-02-02 ENCOUNTER — OFFICE VISIT (OUTPATIENT)
Dept: PHYSICAL THERAPY | Facility: REHABILITATION | Age: 37
End: 2022-02-02
Payer: COMMERCIAL

## 2022-02-02 DIAGNOSIS — S31.815S DOG BITE OF BUTTOCK, RIGHT, SEQUELA: ICD-10-CM

## 2022-02-02 DIAGNOSIS — S81.852S DOG BITE OF LEFT LOWER LEG WITH INFECTION, SEQUELA: ICD-10-CM

## 2022-02-02 DIAGNOSIS — G89.29 CHRONIC RIGHT HIP PAIN: Primary | ICD-10-CM

## 2022-02-02 DIAGNOSIS — M79.605 LEFT LEG PAIN: ICD-10-CM
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
## 2022-02-02 DIAGNOSIS — M25.551 CHRONIC RIGHT HIP PAIN: Primary | ICD-10-CM

## 2022-02-02 DIAGNOSIS — L08.9 DOG BITE OF LEFT LOWER LEG WITH INFECTION, SEQUELA: ICD-10-CM
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
## 2022-02-02 PROCEDURE — 97140 MANUAL THERAPY 1/> REGIONS: CPT

## 2022-02-02 PROCEDURE — 97110 THERAPEUTIC EXERCISES: CPT

## 2022-02-02 NOTE — PROGRESS NOTES
Daily Note     Today's date: 2022  Patient name: Roni Mendiola  : 1985  MRN: [id number]  Referring provider: Aicha Garza DO  Dx:   Encounter Diagnosis     ICD-10-CM    1  Chronic right hip pain  M25 551     G89 29    2  Left leg pain  M79 605    3  Dog bite of buttock, right, sequela  S31 815S     W54  0XXS    4  Dog bite of left lower leg with infection, sequela  S81 852S     L08 9     W54  0XXS        Start Time: 1100  Stop Time: 1145  Total time in clinic (min): 45 minutes    Subjective: Pt reports continued L calf pain and R hip pain  Has difficulty navigating hills - has to weave to get up hills  Objective: See treatment diary below    FOTO = 36    Assessment: Tolerated treatment well  Patient would benefit from continued PT  Pt continues to have significant tenderness to palpation within several inches of the calf scars  IASTM focused on the achilles tendon today secondary to increase in gastroc tightness since the injury as a result from ankle mobility disuse  After several weight shifting trials utilizing the parallel bar, pt was able to perform single limb stance on the LLE without UE assist up to 7 seconds  1:1 with Naa Vigil DPT entirety of tx  Plan: Progress treatment as tolerated         Precautions: multiple dog bites 2 years ago; high irritability; marijuana use       Manuals 1/10 1/12 1/19 2/2          R hip PROM              L ankle PROM              L ankle MREs              IASTM L lat ankle and gastroc 10'   L lat ankle and gastroc 10'  L lat ankle and gastroc 10'  MM L lat ankle, gastroc, achilles 10'          L ankle isometrics 3x5 5"x5 ea 5"x5 ea           Neuro Re-Ed              Toe Curls/Spreads 1'x2  np 2x1'           Short Foot Holds              Prone hip ext              Bridges              Clamshells              Hip CAR              Table hip 3 way              Mini squats              Weight shifting    5" alt LE  15x ea w/ UE assist          SLS 10x5" L          Ther Ex              Treadmill    5'           Ankle circles               BAPS seated 2x1' ea ap/ml 2x1' ea ap/ml 2x1' ML 2x1' ap/ml          4 way ankle   DF/DF 3x5 ea ytb 15x ea ytb          Seated heel raises/toe raises 2x1'  2x1' 2x1' 2x1'          Sidestepping              Sit to Stands 3x8 HL table 2x15 HL table 3x8 HL table, tap 3x8 on chair          Active straight leg raise  2x5 np            Standing gastroc stretch manual 2'  manual 2' Manual 2' Long sitting  3x30"          [place] wipers  1'x2  np            Ther Activity              Step ups                            Gait Training              Alter G                            Modalities

## 2022-02-07 ENCOUNTER — APPOINTMENT (OUTPATIENT)
Dept: PHYSICAL THERAPY | Facility: REHABILITATION | Age: 37
End: 2022-02-07
Payer: COMMERCIAL

## 2022-02-09 ENCOUNTER — OFFICE VISIT (OUTPATIENT)
Dept: PHYSICAL THERAPY | Facility: REHABILITATION | Age: 37
End: 2022-02-09
Payer: COMMERCIAL

## 2022-02-09 DIAGNOSIS — L08.9 DOG BITE OF LEFT LOWER LEG WITH INFECTION, SEQUELA: ICD-10-CM

## 2022-02-09 DIAGNOSIS — M79.605 LEFT LEG PAIN: Primary | ICD-10-CM
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
## 2022-02-09 DIAGNOSIS — S81.852S DOG BITE OF LEFT LOWER LEG WITH INFECTION, SEQUELA: ICD-10-CM

## 2022-02-09 DIAGNOSIS — M25.551 CHRONIC RIGHT HIP PAIN: ICD-10-CM

## 2022-02-09 DIAGNOSIS — G89.29 CHRONIC RIGHT HIP PAIN: ICD-10-CM

## 2022-02-09 DIAGNOSIS — S31.815S DOG BITE OF BUTTOCK, RIGHT, SEQUELA: ICD-10-CM
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
## 2022-02-09 PROCEDURE — 97110 THERAPEUTIC EXERCISES: CPT

## 2022-02-09 PROCEDURE — 97112 NEUROMUSCULAR REEDUCATION: CPT

## 2022-02-09 PROCEDURE — 97140 MANUAL THERAPY 1/> REGIONS: CPT

## 2022-02-09 NOTE — PROGRESS NOTES
Daily Note     Today's date: 2022  Patient name: Opal Weldon  : 1985  MRN: [id number]  Referring provider: Haylee Roland DO  Dx:   Encounter Diagnosis     ICD-10-CM    1  Left leg pain  M79 605    2  Chronic right hip pain  M25 551     G89 29    3  Dog bite of buttock, right, sequela  S31 815S     W54  0XXS    4  Dog bite of left lower leg with infection, sequela  S81 852S     L08 9     W54  0XXS                   Subjective: Pt reports pain has improved today from previous visit  He notes initially have soreness, but has improved  Objective: See treatment diary below        Assessment: Tolerated treatment well  Pt able to progress several exercises today without significant increase in pain  Able to provide more pressure during IASTM over painful areas with less pain reports compared to previous sessions  Pt demonstrating improved DF ROM and improved overall ankle strength  Monitor response to progressions at f/u  Patient would benefit from continued PT  1:1 with Den Velasquez DPT for entirety of tx  Plan: Progress treatment as tolerated         Precautions: multiple dog bites 2 years ago; high irritability; marijuana use       Manuals 1/10 1/12 1/19 2/2 2/9         R hip PROM              L ankle PROM              L ankle MREs              IASTM L lat ankle and gastroc 10'   L lat ankle and gastroc 10'  L lat ankle and gastroc 10'  MM L lat ankle, gastroc, achilles 10' MM L lat ankle, gastroc, achilles 10'         L ankle isometrics 3x5 5"x5 ea 5"x5 ea           Neuro Re-Ed              Toe Curls/Spreads 1'x2  np 2x1'           Short Foot Holds              Prone hip ext              Bridges              Clamshells              Hip CAR              Table hip 3 way              Mini squats              Weight shifting    5" alt LE  15x ea w/ UE assist 5" alt LE  15x ea w/ UE assist         SLS    10x5" L 10x7" L         Ther Ex              Treadmill    5'  5'         Ankle circles BAPS seated 2x1' ea ap/ml 2x1' ea ap/ml 2x1' ML 2x1' ap/ml np         4 way ankle   DF/DF 3x5 ea ytb 15x ea ytb 15x ea ytb         Seated heel raises/toe raises 2x1'  2x1' 2x1' 2x1' 2x15 ea 10#         Sidestepping              Sit to Stands 3x8 HL table 2x15 HL table 3x8 HL table, tap 3x8 on chair 3x8 on chair 10#         Active straight leg raise  2x5 np            Standing gastroc stretch manual 2'  manual 2' Manual 2' Long sitting  3x30" Long sitting  4x20"    3x30" stand         [facility] wipers  1'x2  np            Ther Activity              Step ups     x10 FSU/ FSD B 4"                       Gait Training              Alter G                            Modalities

## 2022-02-14 ENCOUNTER — OFFICE VISIT (OUTPATIENT)
Dept: PHYSICAL THERAPY | Facility: REHABILITATION | Age: 37
End: 2022-02-14
Payer: COMMERCIAL

## 2022-02-14 DIAGNOSIS — S31.815S DOG BITE OF BUTTOCK, RIGHT, SEQUELA: ICD-10-CM
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
## 2022-02-14 DIAGNOSIS — G89.29 CHRONIC RIGHT HIP PAIN: ICD-10-CM

## 2022-02-14 DIAGNOSIS — L08.9 DOG BITE OF LEFT LOWER LEG WITH INFECTION, SEQUELA: ICD-10-CM

## 2022-02-14 DIAGNOSIS — S81.852S DOG BITE OF LEFT LOWER LEG WITH INFECTION, SEQUELA: ICD-10-CM
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
## 2022-02-14 DIAGNOSIS — M79.605 LEFT LEG PAIN: Primary | ICD-10-CM

## 2022-02-14 DIAGNOSIS — M25.551 CHRONIC RIGHT HIP PAIN: ICD-10-CM

## 2022-02-14 PROCEDURE — 97140 MANUAL THERAPY 1/> REGIONS: CPT | Performed by: PHYSICAL THERAPIST

## 2022-02-14 PROCEDURE — 97110 THERAPEUTIC EXERCISES: CPT | Performed by: PHYSICAL THERAPIST

## 2022-02-14 NOTE — PROGRESS NOTES
Daily Note     Today's date: 2022  Patient name: Kieran Julian  : 1985  MRN: [id number]  Referring provider: Elkin Phillips DO  Dx:   Encounter Diagnosis     ICD-10-CM    1  Left leg pain  M79 605    2  Chronic right hip pain  M25 551     G89 29    3  Dog bite of buttock, right, sequela  S31 815S     W54  0XXS    4  Dog bite of left lower leg with infection, sequela  S81 852S     L08 9     W54  0XXS                   Subjective: Patient stated moderate soreness prior to treatment session  Objective: See treatment diary below        Assessment: Patient demonstrates limited tolerance to light pressure with IASTM; demonstrates limited ROM with ankle inversion and eversion  Plan: Progress treatment as tolerated         Precautions: multiple dog bites 2 years ago; high irritability; marijuana use       Manuals 1/10 1/12 1/19 2/2 2/9 2/14        R hip PROM              L ankle PROM              L ankle MREs              IASTM L lat ankle and gastroc 10'   L lat ankle and gastroc 10'  L lat ankle and gastroc 10'  MM L lat ankle, gastroc, achilles 10' MM L lat ankle, gastroc, achilles 10' L lat ankle, gastroc, achilles 10'        L ankle isometrics 3x5 5"x5 ea 5"x5 ea           Neuro Re-Ed              Toe Curls/Spreads 1'x2  np 2x1'           Short Foot Holds              Prone hip ext              Bridges              Clamshells              Hip CAR              Table hip 3 way              Mini squats              Weight shifting    5" alt LE  15x ea w/ UE assist 5" alt LE  15x ea w/ UE assist 5" alt LE  15x ea w/ UE assist        SLS    10x5" L 10x7" L 10x7" L        Ther Ex              Treadmill    5'  5' 5'        Ankle circles               BAPS seated 2x1' ea ap/ml 2x1' ea ap/ml 2x1' ML 2x1' ap/ml np np        4 way ankle   DF/DF 3x5 ea ytb 15x ea ytb 15x ea ytb 15x ea ytb        Seated heel raises/toe raises 2x1'  2x1' 2x1' 2x1' 2x15 ea 10# 2x15 ea 10#        Sidestepping              Sit to Stands 3x8 HL table 2x15 HL table 3x8 HL table, tap 3x8 on chair 3x8 on chair 10# 3x8 on chair 10#        Active straight leg raise  2x5 np            Standing gastroc stretch manual 2'  manual 2' Manual 2' Long sitting  3x30" Long sitting  4x20"    3x30" stand Long sitting  4x20"            3x30" stand        Windshield wipers  1'x2  np            Ther Activity              Step ups     x10 FSU/ FSD B 4" x20 FSU/ FSD B 4"                      Gait Training              Alter G                            Modalities

## 2022-02-16 ENCOUNTER — APPOINTMENT (OUTPATIENT)
Dept: PHYSICAL THERAPY | Facility: REHABILITATION | Age: 37
End: 2022-02-16
Payer: COMMERCIAL

## 2022-02-17 ENCOUNTER — OFFICE VISIT (OUTPATIENT)
Dept: PHYSICAL THERAPY | Facility: REHABILITATION | Age: 37
End: 2022-02-17
Payer: COMMERCIAL

## 2022-02-17 DIAGNOSIS — M79.605 LEFT LEG PAIN: Primary | ICD-10-CM
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
## 2022-02-17 DIAGNOSIS — L08.9 DOG BITE OF LEFT LOWER LEG WITH INFECTION, SEQUELA: ICD-10-CM

## 2022-02-17 DIAGNOSIS — S31.815S DOG BITE OF BUTTOCK, RIGHT, SEQUELA: ICD-10-CM

## 2022-02-17 DIAGNOSIS — G89.29 CHRONIC RIGHT HIP PAIN: ICD-10-CM

## 2022-02-17 DIAGNOSIS — M25.551 CHRONIC RIGHT HIP PAIN: ICD-10-CM

## 2022-02-17 DIAGNOSIS — S81.852S DOG BITE OF LEFT LOWER LEG WITH INFECTION, SEQUELA: ICD-10-CM

## 2022-02-17 PROCEDURE — 97110 THERAPEUTIC EXERCISES: CPT | Performed by: PHYSICAL THERAPIST

## 2022-02-17 PROCEDURE — 97140 MANUAL THERAPY 1/> REGIONS: CPT | Performed by: PHYSICAL THERAPIST

## 2022-02-17 NOTE — PROGRESS NOTES
Daily Note     Today's date: 2022  Patient name: Eric Grajeda  : 1985  MRN: [id number]  Referring provider: Loly Zaman DO  Dx:   Encounter Diagnosis     ICD-10-CM    1  Left leg pain  M79 605    2  Chronic right hip pain  M25 551     G89 29    3  Dog bite of buttock, right, sequela  S31 815S     W54  0XXS    4  Dog bite of left lower leg with infection, sequela  S81 852S     L08 9     W54  0XXS                   Subjective: Patient stated moderate soreness prior to treatment secondary to having a busy day today  Objective: See treatment diary below        Assessment: Patient continues to demonstrate limited ROM with inversion/eversion; limited tolerance to IASTM  Plan: Progress treatment as tolerated         Precautions: multiple dog bites 2 years ago; high irritability; marijuana use       Manuals 1/10 1/12 1/19 2/2 2/9 2/14 2/17       R hip PROM              L ankle PROM              L ankle MREs              IASTM L lat ankle and gastroc 10'   L lat ankle and gastroc 10'  L lat ankle and gastroc 10'  MM L lat ankle, gastroc, achilles 10' MM L lat ankle, gastroc, achilles 10' L lat ankle, gastroc, achilles 10' L lat ankle, gastroc, achilles 10'       L ankle isometrics 3x5 5"x5 ea 5"x5 ea           Neuro Re-Ed              Toe Curls/Spreads 1'x2  np 2x1'           Short Foot Holds              Prone hip ext              Bridges              Clamshells              Hip CAR              Table hip 3 way              Mini squats              Weight shifting    5" alt LE  15x ea w/ UE assist 5" alt LE  15x ea w/ UE assist 5" alt LE  15x ea w/ UE assist        SLS    10x5" L 10x7" L 10x7" L 10x10" L       Ther Ex              Treadmill    5'  5' 5' 10'       Ankle circles               BAPS seated 2x1' ea ap/ml 2x1' ea ap/ml 2x1' ML 2x1' ap/ml np np        4 way ankle   DF/DF 3x5 ea ytb 15x ea ytb 15x ea ytb 15x ea ytb 20x ea ytb       Seated heel raises/toe raises 2x1'  2x1' 2x1' 2x1' 2x15 ea 10# 2x15 ea 10# 2x15 ea 10#       Sidestepping              Sit to Stands 3x8 HL table 2x15 HL table 3x8 HL table, tap 3x8 on chair 3x8 on chair 10# 3x8 on chair 10# 3x8 on chair 10#       Active straight leg raise  2x5 np            Standing gastroc stretch manual 2'  manual 2' Manual 2' Long sitting  3x30" Long sitting  4x20"    3x30" stand Long sitting  4x20"            3x30" stand Long sitting  4x20"         [place] wipers  1'x2  np            Ther Activity              Step ups     x10 FSU/ FSD B 4" x20 FSU/ FSD B 4" x20 FSU/ FSD B 4"                     Gait Training              Alter G                            Modalities

## 2022-02-23 ENCOUNTER — APPOINTMENT (OUTPATIENT)
Dept: PHYSICAL THERAPY | Facility: REHABILITATION | Age: 37
End: 2022-02-23
Payer: COMMERCIAL

## 2022-02-24 ENCOUNTER — OFFICE VISIT (OUTPATIENT)
Dept: PHYSICAL THERAPY | Facility: REHABILITATION | Age: 37
End: 2022-02-24
Payer: COMMERCIAL
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
## 2022-02-24 DIAGNOSIS — G89.29 CHRONIC RIGHT HIP PAIN: ICD-10-CM

## 2022-02-24 DIAGNOSIS — S81.852S DOG BITE OF LEFT LOWER LEG WITH INFECTION, SEQUELA: ICD-10-CM
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
## 2022-02-24 DIAGNOSIS — S31.815S DOG BITE OF BUTTOCK, RIGHT, SEQUELA: ICD-10-CM

## 2022-02-24 DIAGNOSIS — L08.9 DOG BITE OF LEFT LOWER LEG WITH INFECTION, SEQUELA: ICD-10-CM

## 2022-02-24 DIAGNOSIS — M25.551 CHRONIC RIGHT HIP PAIN: ICD-10-CM

## 2022-02-24 DIAGNOSIS — M79.605 LEFT LEG PAIN: Primary | ICD-10-CM

## 2022-02-24 PROCEDURE — 97112 NEUROMUSCULAR REEDUCATION: CPT | Performed by: PHYSICAL THERAPIST

## 2022-02-24 PROCEDURE — 97110 THERAPEUTIC EXERCISES: CPT | Performed by: PHYSICAL THERAPIST

## 2022-02-24 NOTE — PROGRESS NOTES
Daily Note     Today's date: 2022  Patient name: Amarilis Robert  : 1985  MRN: [id number]  Referring provider: Thomas Tilley DO  Dx:   Encounter Diagnosis     ICD-10-CM    1  Left leg pain  M79 605    2  Chronic right hip pain  M25 551     G89 29    3  Dog bite of buttock, right, sequela  S31 815S     W54  0XXS    4  Dog bite of left lower leg with infection, sequela  S81 852S     L08 9     W54  0XXS                   Subjective: Doing pretty good  The exercises here have been helping me  Objective: See treatment diary below      Assessment: Tolerated treatment well  Showing good improvements with physical therapy, with less irritability as well as increased tolerance for exercise  Was challenged with wobble board exercise more so in lateral direction versus AP direction  Reports still is challenged with walking on inclines, stairs, and medial/lateral movements of foot  Will trial incline TM next visit, and continue to work on interventions to match these deficits  Patient would benefit from continued PT      Plan: Continue per plan of care        Precautions: multiple dog bites 2 years ago; high irritability; marijuana use       Manuals 1/10 1/12 1/19 2/2 2/9 2/14 2/17 2/24      R hip PROM              L ankle PROM              L ankle MREs              IASTM L lat ankle and gastroc 10'   L lat ankle and gastroc 10'  L lat ankle and gastroc 10'  MM L lat ankle, gastroc, achilles 10' MM L lat ankle, gastroc, achilles 10' L lat ankle, gastroc, achilles 10' L lat ankle, gastroc, achilles 10' SE 10'       L ankle isometrics 3x5 5"x5 ea 5"x5 ea           Neuro Re-Ed              Toe Curls/Spreads 1'x2  np 2x1'           Short Foot Holds              Prone hip ext              Single Leg Bridge - Hug other leg        3x8       Clamshells              Hip CAR              Total Gym Sidelying        2x15 B      Wobble Board         20x AP/ML with balance       Weight shifting    5" alt LE  15x ea w/ UE assist 5" alt LE  15x ea w/ UE assist 5" alt LE  15x ea w/ UE assist        SLS    10x5" L 10x7" L 10x7" L 10x10" L 10x10" L (trial foam nv)      Ther Ex              Treadmill    5'  5' 5' 10' 10'  (trial incline nv)      Ankle circles               BAPS seated 2x1' ea ap/ml 2x1' ea ap/ml 2x1' ML 2x1' ap/ml np np        4 way ankle   DF/DF 3x5 ea ytb 15x ea ytb 15x ea ytb 15x ea ytb 20x ea ytb 3x10 otb      Seated heel raises/toe raises 2x1'  2x1' 2x1' 2x1' 2x15 ea 10# 2x15 ea 10# 2x15 ea 10# 3x15 12#      Sidestepping              Sit to Stands 3x8 HL table 2x15 HL table 3x8 HL table, tap 3x8 on chair 3x8 on chair 10# 3x8 on chair 10# 3x8 on chair 10# 4x8 on chair 15# KB      Active straight leg raise  2x5 np            Standing gastroc stretch manual 2'  manual 2' Manual 2' Long sitting  3x30" Long sitting  4x20"    3x30" stand Long sitting  4x20"            3x30" stand Long sitting  4x20"   4x20"       Windshield wipers  1'x2  np            Ther Activity              Step ups     x10 FSU/ FSD B 4" x20 FSU/ FSD B 4" x20 FSU/ FSD B 4" 2x20 10# DBs                    Gait Training              Alter G                            Modalities

## 2022-02-28 ENCOUNTER — OFFICE VISIT (OUTPATIENT)
Dept: PHYSICAL THERAPY | Facility: REHABILITATION | Age: 37
End: 2022-02-28
Payer: COMMERCIAL

## 2022-02-28 DIAGNOSIS — S31.815S DOG BITE OF BUTTOCK, RIGHT, SEQUELA: ICD-10-CM
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
## 2022-02-28 DIAGNOSIS — L08.9 DOG BITE OF LEFT LOWER LEG WITH INFECTION, SEQUELA: ICD-10-CM

## 2022-02-28 DIAGNOSIS — M79.605 LEFT LEG PAIN: Primary | ICD-10-CM

## 2022-02-28 DIAGNOSIS — G89.29 CHRONIC RIGHT HIP PAIN: ICD-10-CM

## 2022-02-28 DIAGNOSIS — M25.551 CHRONIC RIGHT HIP PAIN: ICD-10-CM
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
## 2022-02-28 DIAGNOSIS — S81.852S DOG BITE OF LEFT LOWER LEG WITH INFECTION, SEQUELA: ICD-10-CM

## 2022-02-28 PROCEDURE — 97140 MANUAL THERAPY 1/> REGIONS: CPT

## 2022-02-28 PROCEDURE — 97112 NEUROMUSCULAR REEDUCATION: CPT

## 2022-02-28 PROCEDURE — 97110 THERAPEUTIC EXERCISES: CPT

## 2022-02-28 NOTE — PROGRESS NOTES
Daily Note     Today's date: 2022  Patient name: Lex Francisco  : 1985  MRN: [id number]  Referring provider: Mirta Reyes DO  Dx:   Encounter Diagnosis     ICD-10-CM    1  Left leg pain  M79 605    2  Chronic right hip pain  M25 551     G89 29    3  Dog bite of buttock, right, sequela  S31 815S     W54  0XXS    4  Dog bite of left lower leg with infection, sequela  S81 852S     L08 9     W54  0XXS                   Subjective: Pt reports no new complaints for today's session  He notes continuing to feel better overall with PT exercises  Objective: See treatment diary below      Assessment: Tolerated treatment well  Pt continues to demonstrate several strengthening and ROM progressions with less difficulty and c/o pain  Pt requesting IASTM to achilles tendon with good relief  Monitor response at f/u, continue to progress as tolerated  Patient would benefit from continued PT  Plan: Continue per plan of care        Precautions: multiple dog bites 2 years ago; high irritability; marijuana use       Manuals 1/10 1/12 1/19 2/2 2/9 2/14 2/17 2/24 2/28     R hip PROM              L ankle PROM              L ankle MREs              IASTM L lat ankle and gastroc 10'   L lat ankle and gastroc 10'  L lat ankle and gastroc 10'  MM L lat ankle, gastroc, achilles 10' MM L lat ankle, gastroc, achilles 10' L lat ankle, gastroc, achilles 10' L lat ankle, gastroc, achilles 10' SE 10'  CM 10'     L ankle isometrics 3x5 5"x5 ea 5"x5 ea           Neuro Re-Ed              Toe Curls/Spreads 1'x2  np 2x1'           Short Foot Holds              Prone hip ext              Single Leg Bridge - Hug other leg        3x8  3x10     Clamshells              Hip CAR              [employer] Gym Sidelying        2x15 B NV     Wobble Board         20x AP/ML with balance  20x AP/ML      Weight shifting    5" alt LE  15x ea w/ UE assist 5" alt LE  15x ea w/ UE assist 5" alt LE  15x ea w/ UE assist        SLS    10x5" L 10x7" L 10x7" L 10x10" L 10x10" L (trial foam nv) 10"x6 foam     Ther Ex              Treadmill    5'  5' 5' 10' 10'  (trial incline nv) 5' 7% incline     Ankle circles               BAPS seated 2x1' ea ap/ml 2x1' ea ap/ml 2x1' ML 2x1' ap/ml np np        4 way ankle   DF/DF 3x5 ea ytb 15x ea ytb 15x ea ytb 15x ea ytb 20x ea ytb 3x10 otb 3x10 gtb ea     Seated heel raises/toe raises 2x1'  2x1' 2x1' 2x1' 2x15 ea 10# 2x15 ea 10# 2x15 ea 10# 3x15 12# 3x15 12#     Sidestepping              Sit to Stands 3x8 HL table 2x15 HL table 3x8 HL table, tap 3x8 on chair 3x8 on chair 10# 3x8 on chair 10# 3x8 on chair 10# 4x8 on chair 15# KB 4x8 on chair 15# KB     Active straight leg raise  2x5 np            Standing gastroc stretch manual 2'  manual 2' Manual 2' Long sitting  3x30" Long sitting  4x20"    3x30" stand Long sitting  4x20"            3x30" stand Long sitting  4x20"   4x20"  4x20" on blue pad     [place] wipers  1'x2  np            Ther Activity              Step ups     x10 FSU/ FSD B 4" x20 FSU/ FSD B 4" x20 FSU/ FSD B 4" 2x20 10# DBs NV                   Gait Training              Alter G                            Modalities

## 2022-03-03 ENCOUNTER — OFFICE VISIT (OUTPATIENT)
Dept: PHYSICAL THERAPY | Facility: REHABILITATION | Age: 37
End: 2022-03-03
Payer: COMMERCIAL

## 2022-03-03 DIAGNOSIS — M79.605 LEFT LEG PAIN: Primary | ICD-10-CM

## 2022-03-03 DIAGNOSIS — S31.815S DOG BITE OF BUTTOCK, RIGHT, SEQUELA: ICD-10-CM

## 2022-03-03 DIAGNOSIS — M25.551 CHRONIC RIGHT HIP PAIN: ICD-10-CM
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
## 2022-03-03 DIAGNOSIS — G89.29 CHRONIC RIGHT HIP PAIN: ICD-10-CM

## 2022-03-03 DIAGNOSIS — L08.9 DOG BITE OF LEFT LOWER LEG WITH INFECTION, SEQUELA: ICD-10-CM

## 2022-03-03 DIAGNOSIS — S81.852S DOG BITE OF LEFT LOWER LEG WITH INFECTION, SEQUELA: ICD-10-CM
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
## 2022-03-03 PROCEDURE — 97110 THERAPEUTIC EXERCISES: CPT

## 2022-03-03 PROCEDURE — 97112 NEUROMUSCULAR REEDUCATION: CPT

## 2022-03-03 NOTE — PROGRESS NOTES
Daily Note     Today's date: 3/3/2022  Patient name: Elliot Augustin  : 1985  MRN: [id number]  Referring provider: Gabriella Stephens DO  Dx:   Encounter Diagnosis     ICD-10-CM    1  Left leg pain  M79 605    2  Chronic right hip pain  M25 551     G89 29    3  Dog bite of buttock, right, sequela  S31 815S     W54  0XXS    4  Dog bite of left lower leg with infection, sequela  S81 852S     L08 9     W54  0XXS                   Subjective: Pt reports improvement with strength overall, still has pain at bite area with motion      Objective: See treatment diary below      Assessment: Tolerated treatment well  Patient would benefit from continued PT   Pt  able to complete all exercises with no overall increase in pain during or after session  Pt able to increase some exercises, but others were withheld due to arrival time  Pt arrived late for session  Pt  1:1 with PTA for entirety  Plan: Continue per plan of care        Precautions: multiple dog bites 2 years ago; high irritability; marijuana use       Manuals 2/9 2/14 2/17 2/24 2/28 3/3    R hip PROM          L ankle PROM          L ankle MREs          IASTM MM L lat ankle, gastroc, achilles 10' L lat ankle, gastroc, achilles 10' L lat ankle, gastroc, achilles 10' SE 10'  CM 10'     L ankle isometrics          Neuro Re-Ed          Toe Curls/Spreads          Short Foot Holds          Prone hip ext          Single Leg Bridge - Hug other leg    3x8  3x10     Clamshells          Hip CAR          Total Gym Sidelying    2x15 B NV     Wobble Board     20x AP/ML with balance  20x AP/ML  20x a/p m/l    Weight shifting 5" alt LE  15x ea w/ UE assist 5" alt LE  15x ea w/ UE assist        SLS 10x7" L 10x7" L 10x10" L 10x10" L (trial foam nv) 10"x6 foam 6x10" blue TB foam    Ther Ex          Treadmill 5' 5' 10' 10'  (trial incline nv) 5' 7% incline 5' 7% incline    Ankle circles           BAPS seated np np        4 way ankle 15x ea ytb 15x ea ytb 20x ea ytb 3x10 otb 3x10 gtb ea 30x ea gtb    Seated heel raises/toe raises 2x15 ea 10# 2x15 ea 10# 2x15 ea 10# 3x15 12# 3x15 12# 3x15 12#    Sidestepping          Sit to Stands 3x8 on chair 10# 3x8 on chair 10# 3x8 on chair 10# 4x8 on chair 15# KB 4x8 on chair 15# KB 3x10 15# kb    Active straight leg raise           Standing gastroc stretch Long sitting  4x20"    3x30" stand Long sitting  4x20"            3x30" stand Long sitting  4x20"   4x20"  4x20" on blue pad 4x20" [facility]           Ther Activity          Step ups x10 FSU/ FSD B 4" x20 FSU/ FSD B 4" x20 FSU/ FSD B 4" 2x20 10# DBs NV               Gait Training          Alter G                    Modalities

## 2022-03-10 ENCOUNTER — APPOINTMENT (OUTPATIENT)
Dept: PHYSICAL THERAPY | Facility: REHABILITATION | Age: 37
End: 2022-03-10
Payer: COMMERCIAL

## 2022-03-15 ENCOUNTER — OFFICE VISIT (OUTPATIENT)
Dept: PHYSICAL THERAPY | Facility: REHABILITATION | Age: 37
End: 2022-03-15
Payer: COMMERCIAL

## 2022-03-15 DIAGNOSIS — S81.852S DOG BITE OF LEFT LOWER LEG WITH INFECTION, SEQUELA: ICD-10-CM

## 2022-03-15 DIAGNOSIS — G89.29 CHRONIC RIGHT HIP PAIN: ICD-10-CM

## 2022-03-15 DIAGNOSIS — L08.9 DOG BITE OF LEFT LOWER LEG WITH INFECTION, SEQUELA: ICD-10-CM

## 2022-03-15 DIAGNOSIS — M25.551 CHRONIC RIGHT HIP PAIN: ICD-10-CM

## 2022-03-15 DIAGNOSIS — S31.815S DOG BITE OF BUTTOCK, RIGHT, SEQUELA: ICD-10-CM
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
## 2022-03-15 DIAGNOSIS — M79.605 LEFT LEG PAIN: Primary | ICD-10-CM

## 2022-03-15 PROCEDURE — 97110 THERAPEUTIC EXERCISES: CPT

## 2022-03-15 NOTE — PROGRESS NOTES
Daily Note     Today's date: 3/15/2022  Patient name: Ortiz Duarte  : 1985  MRN: [id number]  Referring provider: Eyad Hugo DO  Dx:   Encounter Diagnosis     ICD-10-CM    1  Left leg pain  M79 605    2  Chronic right hip pain  M25 551     G89 29    3  Dog bite of buttock, right, sequela  S31 815S     W54  0XXS    4  Dog bite of left lower leg with infection, sequela  S81 852S     L08 9     W54  0XXS                   Subjective: Pt offers no new complaints upon arrival      Objective: See treatment diary below  2    Assessment: Tolerated treatment well  Patient would benefit from continued PT   Pt  able to complete all exercises with no increase in pain during or after session  Pt able to progress some exercises this session without complaint  Pt arrived late for session  Pt  1:1 with PTA for entirety  Plan: Continue per plan of care        Precautions: multiple dog bites 2 years ago; high irritability; marijuana use       Manuals 2/9 2/14 2/17 2/24 2/28 3/3 3/15   R hip PROM          L ankle PROM          L ankle MREs          IASTM MM L lat ankle, gastroc, achilles 10' L lat ankle, gastroc, achilles 10' L lat ankle, gastroc, achilles 10' SE 10'  CM 10'     L ankle isometrics          Neuro Re-Ed          Toe Curls/Spreads          Short Foot Holds          Prone hip ext          Single Leg Bridge - Hug other leg    3x8  3x10     Clamshells          Hip CAR          Total Gym Sidelying    2x15 B NV     Wobble Board     20x AP/ML with balance  20x AP/ML  20x a/p m/l 20x a/p m/l   Weight shifting 5" alt LE  15x ea w/ UE assist 5" alt LE  15x ea w/ UE assist        SLS 10x7" L 10x7" L 10x10" L 10x10" L (trial foam nv) 10"x6 foam 6x10" blue TB foam 6x10" biodex foam   Ther Ex          Treadmill 5' 5' 10' 10'  (trial incline nv) 5' 7% incline 5' 7% incline 5' 7% inc   Ankle circles           BAPS seated np np        4 way ankle 15x ea ytb 15x ea ytb 20x ea ytb 3x10 otb 3x10 gtb ea 30x ea gtb 30x ea gtb Seated heel raises/toe raises 2x15 ea 10# 2x15 ea 10# 2x15 ea 10# 3x15 12# 3x15 12# 3x15 12# 3x15 15#   Sidestepping          Sit to Stands 3x8 on chair 10# 3x8 on chair 10# 3x8 on chair 10# 4x8 on chair 15# KB 4x8 on chair 15# KB 3x10 15# kb 3x10 15# kb   Active straight leg raise           Standing gastroc stretch Long sitting  4x20"    3x30" stand Long sitting  4x20"            3x30" stand Long sitting  4x20"   4x20"  4x20" on blue pad 4x20" prostretch 4x20" prostretch   [facility] wipers           Ther Activity          Step ups x10 FSU/ FSD B 4" x20 FSU/ FSD B 4" x20 FSU/ FSD B 4" 2x20 10# DBs NV               Gait Training          Alter G                    Modalities

## 2022-03-22 ENCOUNTER — APPOINTMENT (OUTPATIENT)
Dept: PHYSICAL THERAPY | Facility: REHABILITATION | Age: 37
End: 2022-03-22
Payer: COMMERCIAL

## 2022-03-24 ENCOUNTER — OFFICE VISIT (OUTPATIENT)
Dept: PHYSICAL THERAPY | Facility: REHABILITATION | Age: 37
End: 2022-03-24
Payer: COMMERCIAL

## 2022-03-24 DIAGNOSIS — M79.605 LEFT LEG PAIN: Primary | ICD-10-CM
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
## 2022-03-24 DIAGNOSIS — M25.551 CHRONIC RIGHT HIP PAIN: ICD-10-CM

## 2022-03-24 DIAGNOSIS — G89.29 CHRONIC RIGHT HIP PAIN: ICD-10-CM

## 2022-03-24 DIAGNOSIS — S31.815S DOG BITE OF BUTTOCK, RIGHT, SEQUELA: ICD-10-CM

## 2022-03-24 DIAGNOSIS — S81.852S DOG BITE OF LEFT LOWER LEG WITH INFECTION, SEQUELA: ICD-10-CM

## 2022-03-24 DIAGNOSIS — L08.9 DOG BITE OF LEFT LOWER LEG WITH INFECTION, SEQUELA: ICD-10-CM

## 2022-03-24 PROCEDURE — 97140 MANUAL THERAPY 1/> REGIONS: CPT

## 2022-03-24 PROCEDURE — 97110 THERAPEUTIC EXERCISES: CPT

## 2022-03-24 NOTE — PROGRESS NOTES
Daily Note     Today's date: 3/24/2022  Patient name: Gómez Manuel  : 1985  MRN: [id number]  Referring provider: Praneeth Chin DO  Dx:   Encounter Diagnosis     ICD-10-CM    1  Left leg pain  M79 605    2  Chronic right hip pain  M25 551     G89 29    3  Dog bite of buttock, right, sequela  S31 815S     W54  0XXS    4  Dog bite of left lower leg with infection, sequela  S81 852S     L08 9     W54  0XXS                   Subjective: Pt  reports no change in status upon arrival       Objective: See treatment diary below      Assessment: Tolerated treatment well  Patient would benefit from continued PT  Pt demonstrating some improvement with movement overall, tolerance to pressure and touch  Pt  able to complete all exercises with no increase in pain during or after session  Pt  1:1 with PTA for entirety  Plan: Continue per plan of care        Precautions: multiple dog bites 2 years ago; high irritability; marijuana use       Manuals 2/17 2/24 2/28 3/3 3/15 3/24   R hip PROM         L ankle PROM         L ankle MREs         IASTM L lat ankle, gastroc, achilles 10' SE 10'  CM 10'   KP 10'   L ankle isometrics         Neuro Re-Ed         Toe Curls/Spreads         Short Foot Holds         Prone hip ext         Single Leg Bridge - Hug other leg  3x8  3x10      Clamshells         Hip CAR         Total Gym Sidelying  2x15 B NV      Wobble Board   20x AP/ML with balance  20x AP/ML  20x a/p m/l 20x a/p m/l 20x a/p m/l   Weight shifting         SLS 10x10" L 10x10" L (trial foam nv) 10"x6 foam 6x10" blue TB foam 6x10" biodex foam 6x10" biodex foam   Ther Ex         Treadmill 10' 10'  (trial incline nv) 5' 7% incline 5' 7% incline 5' 7% inc 10' 7% inc   Ankle circles          BAPS seated         4 way ankle 20x ea ytb 3x10 otb 3x10 gtb ea 30x ea gtb 30x ea gtb 30x ea gtb   Seated heel raises/toe raises 2x15 ea 10# 3x15 12# 3x15 12# 3x15 12# 3x15 15# 4x15 15#   Sidestepping         Sit to Stands 3x8 on chair 10# 4x8 on chair 15# KB 4x8 on chair 15# KB 3x10 15# kb 3x10 15# kb 3x10 15#    Active straight leg raise          Standing gastroc stretch Long sitting  4x20"   4x20"  4x20" on blue pad 4x20" prostretch 4x20" prostretch 4x20" prostretch   [facility]          Ther Activity         Step ups x20 FSU/ FSD B 4" 2x20 10# DBs NV               Gait Training         Alter G                  Modalities

## 2022-03-25 ENCOUNTER — CONSULT (OUTPATIENT)
Dept: PAIN MEDICINE | Facility: CLINIC | Age: 37
End: 2022-03-25
Payer: COMMERCIAL

## 2022-03-25 VITALS
SYSTOLIC BLOOD PRESSURE: 148 MMHG | HEIGHT: 67 IN | WEIGHT: 137 LBS | BODY MASS INDEX: 21.5 KG/M2 | DIASTOLIC BLOOD PRESSURE: 75 MMHG | HEART RATE: 94 BPM

## 2022-03-25 DIAGNOSIS — L08.9 DOG BITE OF LEFT LOWER LEG WITH INFECTION, SEQUELA: ICD-10-CM
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
## 2022-03-25 DIAGNOSIS — S81.852S DOG BITE OF LEFT LOWER LEG WITH INFECTION, SEQUELA: ICD-10-CM

## 2022-03-25 DIAGNOSIS — G57.93 NEUROPATHIC PAIN OF BOTH LEGS: Primary | ICD-10-CM
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
## 2022-03-25 PROBLEM — S81.852A DOG BITE OF LEFT LOWER LEG WITH INFECTION: Status: ACTIVE | Noted: 2022-03-25

## 2022-03-25 PROCEDURE — 99244 OFF/OP CNSLTJ NEW/EST MOD 40: CPT | Performed by: ANESTHESIOLOGY

## 2022-03-25 RX ORDER — GABAPENTIN 300 MG/1
600 CAPSULE ORAL 3 TIMES DAILY
Qty: 180 CAPSULE | Refills: 2 | Status: SHIPPED | OUTPATIENT
Start: 2022-03-25 | End: 2022-05-20

## 2022-03-25 NOTE — PATIENT INSTRUCTIONS
Gabapentin (By mouth)   Gabapentin (paty-a-PEN-tin)  Treats seizures and pain caused by shingles  Brand Name(s): FusePaq Fanatrex, Neurontin   There may be other brand names for this medicine  When This Medicine Should Not Be Used: This medicine is not right for everyone  Do not use it if you had an allergic reaction to gabapentin  How to Use This Medicine:   Capsule, Liquid, Tablet  · Take your medicine as directed  Your dose may need to be changed several times to find what works best for you  If you have epilepsy, do not allow more than 12 hours to pass between doses  · Capsule: Swallow the capsule whole with plenty of water  Do not open, crush, or chew it  · Gralise® tablet: Swallow the tablet whole   Do not crush, break, or chew it  · Neurontin® tablet: If you break a tablet into 2 pieces, use the second half as your next dose  Do not use the half-tablet if the whole tablet has been cut or broken after 28 days  · Oral liquid: Measure the oral liquid medicine with a marked measuring spoon, oral syringe, or medicine cup  · This medicine should come with a Medication Guide  Ask your pharmacist for a copy if you do not have one  · Missed dose: Take a dose as soon as you remember  If it is almost time for your next dose, wait until then and take a regular dose  Do not take extra medicine to make up for a missed dose  · Store the medicine in a closed container at room temperature, away from heat, moisture, and direct light  Store the Neurontin® oral liquid in the refrigerator  Do not freeze  Drugs and Foods to Avoid:   Ask your doctor or pharmacist before using any other medicine, including over-the-counter medicines, vitamins, and herbal products  · Some medicines can affect how gabapentin works  Tell your doctor if you also using hydrocodone or morphine  · If you take an antacid, wait at least 2 hours before you take gabapentin  · Do not drink alcohol while you are using this medicine    · Tell your doctor if you use anything else that makes you sleepy  Some examples are allergy medicine, narcotic pain medicine, and alcohol  Tell your doctor if you are also using lorazepam, oxycodone, or zolpidem  Warnings While Using This Medicine:   · Tell your doctor if you are pregnant or breastfeeding, or if you have kidney problems (including patients receiving dialysis) or lung problems  Tell your doctor if you have a history of depression or mental health problems  · This medicine may cause the following problems:  ? Drug reaction with eosinophilia and systemic symptoms (DRESS) or multiorgan hypersensitivity, which may damage the liver, kidney, blood, heart, or muscles  ? Changes in mood or behavior, including suicidal thoughts or behavior  ? Respiratory depression (serious breathing problem that can be life-threatening), when used with narcotic pain medicines  · Do not stop using this medicine suddenly  Your doctor will need to slowly decrease your dose before you stop it completely  · This medicine may make you dizzy or drowsy  Do not drive or do anything else that could be dangerous until you know how this medicine affects you  · Tell any doctor or dentist who treats you that you are using this medicine  This medicine may affect certain medical test results  · Your doctor will check your progress and the effects of this medicine at regular visits  Keep all appointments  · Keep all medicine out of the reach of children  Never share your medicine with anyone    Possible Side Effects While Using This Medicine:   Call your doctor right away if you notice any of these side effects:  · Allergic reaction: Itching or hives, swelling in your face or hands, swelling or tingling in your mouth or throat, chest tightness, trouble breathing  · Behavior problems, aggression, restlessness, trouble concentrating, moodiness (especially in children)  · Blistering, peeling, red skin rash  · Blue lips, fingernails, or skin, chest pain, fast heartbeat, trouble breathing  · Change in how much or how often you urinate, bloody or cloudy urine  · Dark urine or pale stools, nausea, vomiting, loss of appetite, stomach pain, yellow skin or eyes  · Fever, chills, cough, sore throat, body aches  · Problems with coordination, shakiness, unsteadiness, unusual eye movement  · Rapid weight gain, swelling in your hands, ankles, or feet  · Rash, swollen or tender glands in the neck, armpit, or groin  · Unusual moods or behaviors, thoughts of hurting yourself, feeling depressed  If you notice these less serious side effects, talk with your doctor:   · Dizziness, drowsiness, sleepiness, tiredness  If you notice other side effects that you think are caused by this medicine, tell your doctor  Call your doctor for medical advice about side effects  You may report side effects to FDA at 7-326-FDA-8533    © Copyright [employer] [facility] 2022 Information is for End User's use only and may not be sold, redistributed or otherwise used for commercial purposes  The above information is an  only  It is not intended as medical advice for individual conditions or treatments  Talk to your doctor, nurse or pharmacist before following any medical regimen to see if it is safe and effective for you

## 2022-03-25 NOTE — PROGRESS NOTES
Assessment  1  Neuropathic pain of both legs    2  Dog bite of left lower leg with infection, sequela        Plan  44-year-old male referred by Dr Ayala Matamoros, for initial consultation regarding right buttock pain and left calf pain after getting bit by a dog and 2019  He does have associated numbness and tingling in both regions  He does occasionally get some low back pain, however this is intermittent and mild  Patient does do physical therapy with minimal relief  He currently takes gabapentin 300 mg t i d  and duloxetine 60 mg daily  Duloxetine was recently increased at the end of January by PCP  He gets some relief with these medications  He also uses medical cannabis with some relief  X-ray of the left tibia-fibula from 2019 did not show any fractures, but there was some soft tissue gas in the lateral leg and posterior leg/hindfoot  X-ray of bilateral femurs showed some soft tissue gas in the posteromedial aspect of the thighs bilaterally right worse than left  No fractures noted  Patient has persistent neuropathic pain status post dog bite in 2019 in the right buttock and left calf  Suspecting left peroneal nerve injury and possible right sciatic nerve injury considering locations of previous injury  1  I will order an EMG of bilateral lower extremities   2  I will increase the patient's gabapentin to 600 mg t i d  for his neuropathic complaints  3  Patient will continue with duloxetine as prescribed   4  Medical marijuana per certified prescriber  5  I will follow up the patient in 2 months        My impressions and treatment recommendations were discussed in detail with the patient who verbalized understanding and had no further questions  Discharge instructions were provided  I personally saw and examined the patient and I agree with the above discussed plan of care  No orders of the defined types were placed in this encounter      No orders of the defined types were placed in this encounter  History of Present Illness    Caryn Vu is a 30 y o  male referred by Dr Keshia Sepulveda, for initial consultation regarding right buttock pain and left calf pain after getting bit by a dog and 2019  He does have associated numbness and tingling in both regions  He does have some weakness of the left foot and lower leg  Denies any weakness in the right leg  He does occasionally get some low back pain, however this is intermittent and mild  Patient does do physical therapy with minimal relief  He currently takes gabapentin 300 mg t i d  and duloxetine 60 mg daily  Duloxetine was recently increased at the end of January by PCP  He gets some relief with these medications  He also uses medical cannabis with some relief  X-ray of the left tibia-fibula from 2019 did not show any fractures, but there was some soft tissue gas in the lateral leg and posterior leg/hindfoot  X-ray of bilateral femurs showed some soft tissue gas in the posteromedial aspect of the thighs bilaterally right worse than left  No fractures noted  The patient rates his pain a 9/10 on the pain is constant  The pain is not follow any particular pattern throughout the day  The pain is described as shooting, sharp, pressure-like, and throbbing  The pain is decreased with lying down, sitting, and relaxation  The pain is increased with lying down, standing, bending, sitting, walking, exercise, and coughing  He does find some relief with heat and ice application  Other than as stated above, the patient denies any interval changes in medications, medical condition, mental condition, symptoms, or allergies since the last office visit  I have personally reviewed and/or updated the patient's past medical history, past surgical history, family history, social history, current medications, allergies, and vital signs today  Review of Systems   Constitutional: Positive for unexpected weight change  Negative for fever  HENT: Negative for trouble swallowing  Eyes: Negative for visual disturbance  Respiratory: Negative for shortness of breath and wheezing  Cardiovascular: Negative for chest pain and palpitations  Gastrointestinal: Negative for constipation, diarrhea, nausea and vomiting  Endocrine: Negative for cold intolerance, heat intolerance and polydipsia  Genitourinary: Negative for difficulty urinating and frequency  Musculoskeletal: Positive for myalgias  Negative for arthralgias, gait problem and joint swelling  Skin: Positive for wound  Negative for rash  Neurological: Negative for dizziness, seizures, syncope, weakness and headaches  Hematological: Does not bruise/bleed easily  Psychiatric/Behavioral: Negative for dysphoric mood  Anxiety   All other systems reviewed and are negative  Patient Active Problem List   Diagnosis    [facility]) [facility]    Pyogenic arthritis of right shoulder region ([facility] )    Neuropathic pain of lower extremity, left    Vitamin D deficiency       Past Medical History:   Diagnosis Date    Marijuana use        History reviewed  No pertinent surgical history      Family History   Problem Relation Age of Onset    No Known Problems Mother     No Known Problems Daughter        Social History     Occupational History    Occupation: unemployed   Tobacco Use    Smoking status: Never Smoker    Smokeless tobacco: Never Used   Vaping Use    Vaping Use: Never used   Substance and Sexual Activity    Alcohol use: Yes     Comment: occasionally    Drug use: Yes     Types: Marijuana     Comment: Medical marijuana    Sexual activity: Yes     Partners: Female     Birth control/protection: Condom Male     Comment: 1 partner only at this time       Current Outpatient Medications on File Prior to Visit   Medication Sig    cetirizine (ZyrTEC) 5 MG tablet Take 5 mg by mouth (Patient not taking: Reported on 1/7/2022 )    DULoxetine (Lauryn Comfort) 60 mg delayed release capsule Take 1 capsule (60 mg total) by mouth daily Start taking in 1-2 weeks after taking gabapentin 3 times per day    gabapentin (Neurontin) 300 mg capsule Take 1 capsule (300 mg total) by mouth 3 (three) times a day    Multiple Vitamin (multivitamin) tablet Take 1 tablet by mouth daily     No current facility-administered medications on file prior to visit  No Known Allergies    Physical Exam    /75   Pulse 94   Ht 5' 7" (1 702 m)   Wt 62 1 kg (137 lb)   BMI 21 46 kg/m²     Constitutional: normal, well developed, well nourished, alert, in no distress and non-toxic and no overt pain behavior  Eyes: anicteric  HEENT: grossly intact  Neck: supple, symmetric, trachea midline and no masses   Pulmonary:even and unlabored  Cardiovascular:No edema or pitting edema present  Skin:Normal without rashes or lesions and well hydrated  Psychiatric:Mood and affect appropriate  Neurologic:Cranial Nerves II-XII grossly intact  Musculoskeletal:normal gait  Well-healed dog bite scars on the posterolateral calf of the left leg and right buttock  Right lower extremity strength 5/5 in all muscle groups  Left dorsiflexion, plantar flexion, and EHL 4-5 strength  Left quadriceps and hamstrings 4-5 strength  Left iliopsoas 5/5 strength  Sensation decreased to light touch in the posterolateral aspect of the left thigh  Lumbar paraspinals nontender to palpation  Negative seated straight leg raise bilaterally  Imaging  Impression:   No fracture, no radiopaque foreign body  Workstation:[id number]    Narrative    History: Dog bite  Findings: AP and lateral views of the left tibia/fibula are reviewed  The   osseous structures are intact without fracture  No radiopaque foreign bodies  There is soft tissue gas in the lateral leg and posterior leg/hindfoot      Other Result Text    Seven Lopez MD - 08/30/2019   Formatting of this note might be different from the original  History: Dog bite  Findings: AP and lateral views of the left tibia/fibula are reviewed  The   osseous structures are intact without fracture  No radiopaque foreign bodies  There is soft tissue gas in the lateral leg and posterior leg/hindfoot  IMPRESSION:   Impression:   No fracture, no radiopaque foreign body

## 2022-03-29 ENCOUNTER — OFFICE VISIT (OUTPATIENT)
Dept: PHYSICAL THERAPY | Facility: REHABILITATION | Age: 37
End: 2022-03-29
Payer: COMMERCIAL

## 2022-03-29 DIAGNOSIS — S31.815S DOG BITE OF BUTTOCK, RIGHT, SEQUELA: ICD-10-CM
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
## 2022-03-29 DIAGNOSIS — M25.551 CHRONIC RIGHT HIP PAIN: ICD-10-CM

## 2022-03-29 DIAGNOSIS — M79.605 LEFT LEG PAIN: Primary | ICD-10-CM

## 2022-03-29 DIAGNOSIS — G89.29 CHRONIC RIGHT HIP PAIN: ICD-10-CM

## 2022-03-29 PROCEDURE — 97112 NEUROMUSCULAR REEDUCATION: CPT

## 2022-03-29 PROCEDURE — 97110 THERAPEUTIC EXERCISES: CPT

## 2022-03-29 PROCEDURE — 97140 MANUAL THERAPY 1/> REGIONS: CPT

## 2022-03-31 ENCOUNTER — APPOINTMENT (OUTPATIENT)
Dept: PHYSICAL THERAPY | Facility: REHABILITATION | Age: 37
End: 2022-03-31
Payer: COMMERCIAL

## 2022-04-05 ENCOUNTER — OFFICE VISIT (OUTPATIENT)
Dept: PHYSICAL THERAPY | Facility: REHABILITATION | Age: 37
End: 2022-04-05
Payer: COMMERCIAL

## 2022-04-05 DIAGNOSIS — G89.29 CHRONIC RIGHT HIP PAIN: ICD-10-CM
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
## 2022-04-05 DIAGNOSIS — S81.852S DOG BITE OF LEFT LOWER LEG WITH INFECTION, SEQUELA: ICD-10-CM

## 2022-04-05 DIAGNOSIS — L08.9 DOG BITE OF LEFT LOWER LEG WITH INFECTION, SEQUELA: ICD-10-CM

## 2022-04-05 DIAGNOSIS — M25.551 CHRONIC RIGHT HIP PAIN: ICD-10-CM

## 2022-04-05 DIAGNOSIS — S31.815S DOG BITE OF BUTTOCK, RIGHT, SEQUELA: ICD-10-CM

## 2022-04-05 DIAGNOSIS — M79.605 LEFT LEG PAIN: Primary | ICD-10-CM

## 2022-04-05 PROCEDURE — 97110 THERAPEUTIC EXERCISES: CPT

## 2022-04-05 PROCEDURE — 97140 MANUAL THERAPY 1/> REGIONS: CPT

## 2022-04-05 PROCEDURE — 97112 NEUROMUSCULAR REEDUCATION: CPT

## 2022-04-05 NOTE — PROGRESS NOTES
Daily Note     Today's date: 2022  Patient name: Nadeen Murray  : 1985  MRN: [id number]  Referring provider: Yoselin Andrade DO  Dx:   Encounter Diagnosis     ICD-10-CM    1  Left leg pain  M79 605    2  Chronic right hip pain  M25 551     G89 29    3  Dog bite of buttock, right, sequela  S31 815S     W54  0XXS    4  Dog bite of left lower leg with infection, sequela  S81 852S     L08 9     W54  0XXS                   Subjective: Pt  reports no change in status upon arrival       Objective: See treatment diary below      Assessment: Tolerated treatment well  Patient demonstrated fatigue post treatment and would benefit from continued PT   Pt  able to complete all exercises with no increase in pain during or after session  Pt continues to be hypersensitive to touch at injury area, but reports some improvement with ISTM for desensitization  Pt  1:1 with PTA for entirety  Plan: Continue per plan of care        Precautions: multiple dog bites 2 years ago; high irritability; marijuana use       Manuals 2/24 2/28 3/3 3/15 3/24 3/29 4/5   R hip PROM          L ankle PROM          L ankle MREs          IASTM SE 10'  CM 10'   KP 10' MM 10' KP 10'   L ankle isometrics          Neuro Re-Ed          Toe Curls/Spreads          Short Foot Holds          Prone hip ext          Single Leg Bridge - Hug other leg 3x8  3x10        Clamshells          Hip CAR          Total Gym Sidelying 2x15 B NV        Wobble Board  20x AP/ML with balance  20x AP/ML  20x a/p m/l 20x a/p m/l 20x a/p m/l 20x a/p m/l 20x a/p m/l   Weight shifting          SLS 10x10" L (trial foam nv) 10"x6 foam 6x10" blue TB foam 6x10" biodex foam 6x10" biodex foam 6x10" biodex foam 6x10" biodex foam   Ther Ex          Treadmill 10'  (trial incline nv) 5' 7% incline 5' 7% incline 5' 7% inc 10' 7% inc 10' 7% inc 10' 7% inc   Ankle circles           BAPS seated          4 way ankle 3x10 otb 3x10 gtb ea 30x ea gtb 30x ea gtb 30x ea gtb 30x ea gtb np Seated heel raises/toe raises 3x15 12# 3x15 12# 3x15 12# 3x15 15# 4x15 15# 4x15 15# 4x15 15#   Sidestepping          Sit to Stands 4x8 on chair 15# KB 4x8 on chair 15# KB 3x10 15# kb 3x10 15# kb 3x10 15#  3x10 15# 3x10 15#   Active straight leg raise           Standing gastroc stretch 4x20"  4x20" on blue pad 4x20" prostretch 4x20" prostretch 4x20" prostretch 4x20" prostretch 4x20" prostretch   Windshield wipers           Ther Activity          Step ups 2x20 10# DBs NV                  Gait Training          Alter G                    Modalities

## 2022-04-07 ENCOUNTER — OFFICE VISIT (OUTPATIENT)
Dept: PHYSICAL THERAPY | Facility: REHABILITATION | Age: 37
End: 2022-04-07
Payer: COMMERCIAL

## 2022-04-07 DIAGNOSIS — M25.551 CHRONIC RIGHT HIP PAIN: ICD-10-CM

## 2022-04-07 DIAGNOSIS — S31.815S DOG BITE OF BUTTOCK, RIGHT, SEQUELA: ICD-10-CM
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
## 2022-04-07 DIAGNOSIS — M79.605 LEFT LEG PAIN: Primary | ICD-10-CM

## 2022-04-07 DIAGNOSIS — L08.9 DOG BITE OF LEFT LOWER LEG WITH INFECTION, SEQUELA: ICD-10-CM

## 2022-04-07 DIAGNOSIS — G89.29 CHRONIC RIGHT HIP PAIN: ICD-10-CM
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
## 2022-04-07 DIAGNOSIS — S81.852S DOG BITE OF LEFT LOWER LEG WITH INFECTION, SEQUELA: ICD-10-CM

## 2022-04-07 PROCEDURE — 97110 THERAPEUTIC EXERCISES: CPT

## 2022-04-07 PROCEDURE — 97112 NEUROMUSCULAR REEDUCATION: CPT

## 2022-04-07 PROCEDURE — 97140 MANUAL THERAPY 1/> REGIONS: CPT

## 2022-04-07 NOTE — PROGRESS NOTES
Daily Note     Today's date: 2022  Patient name: Storm Sommer  : 1985  MRN: [id number]  Referring provider: Melissa Valenzuela DO  Dx:   Encounter Diagnosis     ICD-10-CM    1  Left leg pain  M79 605    2  Chronic right hip pain  M25 551     G89 29    3  Dog bite of buttock, right, sequela  S31 815S     W54  0XXS    4  Dog bite of left lower leg with infection, sequela  S81 852S     L08 9     W54  0XXS                   Subjective: Pt states that he's had some more pain than usual in the calf over the weekend, states that IASTM is providing continued relief for him      Objective: See treatment diary below      Assessment: Tolerated treatment well  Patient would benefit from continued PT   Pt  able to complete all exercises with no increase in pain during or after session  Pt noted relief at end of session  Pt  1:1 with PTA for entirety  Pt arrived late for session  Plan: Continue per plan of care        Precautions: multiple dog bites 2 years ago; high irritability; marijuana use       Manuals 2/24 2/28 3/3 3/15 3/24 3/29 4/5 4/7   R hip PROM           L ankle PROM           L ankle MREs           IASTM SE 10'  CM 10'   KP 10' MM 10' KP 10' KP 8'   L ankle isometrics           Neuro Re-Ed           Toe Curls/Spreads           Short Foot Holds           Prone hip ext           Single Leg Bridge - Hug other leg 3x8  3x10         Clamshells           Hip CAR           Total Gym Sidelying 2x15 B NV         Wobble Board  20x AP/ML with balance  20x AP/ML  20x a/p m/l 20x a/p m/l 20x a/p m/l 20x a/p m/l 20x a/p m/l 20x a/p m/l   Weight shifting           SLS 10x10" L (trial foam nv) 10"x6 foam 6x10" blue TB foam 6x10" biodex foam 6x10" biodex foam 6x10" biodex foam 6x10" biodex foam 6x10" biodex foam   Ther Ex           Treadmill 10'  (trial incline nv) 5' 7% incline 5' 7% incline 5' 7% inc 10' 7% inc 10' 7% inc 10' 7% inc 8' 7% inc   Ankle circles            BAPS seated           4 way ankle 3x10 otb 3x10 gtb ea 30x ea gtb 30x ea gtb 30x ea gtb 30x ea gtb np    Seated heel raises/toe raises 3x15 12# 3x15 12# 3x15 12# 3x15 15# 4x15 15# 4x15 15# 4x15 15# 4x15 15#   Sidestepping           Sit to Stands 4x8 on chair 15# KB 4x8 on chair 15# KB 3x10 15# kb 3x10 15# kb 3x10 15#  3x10 15# 3x10 15# 3x10 15#   Active straight leg raise            Standing gastroc stretch 4x20"  4x20" on blue pad 4x20" prostretch 4x20" prostretch 4x20" prostretch 4x20" prostretch 4x20" prostretch 4x20" prostretch   Windshield wipers            Ther Activity           Step ups 2x20 10# DBs NV                    Gait Training           Alter G                      Modalities

## 2022-04-12 ENCOUNTER — APPOINTMENT (OUTPATIENT)
Dept: PHYSICAL THERAPY | Facility: REHABILITATION | Age: 37
End: 2022-04-12
Payer: COMMERCIAL

## 2022-04-14 ENCOUNTER — OFFICE VISIT (OUTPATIENT)
Dept: PHYSICAL THERAPY | Facility: REHABILITATION | Age: 37
End: 2022-04-14
Payer: COMMERCIAL

## 2022-04-14 DIAGNOSIS — M25.551 CHRONIC RIGHT HIP PAIN: ICD-10-CM

## 2022-04-14 DIAGNOSIS — S31.815S DOG BITE OF BUTTOCK, RIGHT, SEQUELA: ICD-10-CM

## 2022-04-14 DIAGNOSIS — S81.852S DOG BITE OF LEFT LOWER LEG WITH INFECTION, SEQUELA: ICD-10-CM

## 2022-04-14 DIAGNOSIS — G89.29 CHRONIC RIGHT HIP PAIN: ICD-10-CM

## 2022-04-14 DIAGNOSIS — M79.605 LEFT LEG PAIN: Primary | ICD-10-CM
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
## 2022-04-14 DIAGNOSIS — L08.9 DOG BITE OF LEFT LOWER LEG WITH INFECTION, SEQUELA: ICD-10-CM

## 2022-04-14 PROCEDURE — 97110 THERAPEUTIC EXERCISES: CPT | Performed by: PHYSICAL MEDICINE & REHABILITATION

## 2022-04-14 PROCEDURE — 97140 MANUAL THERAPY 1/> REGIONS: CPT | Performed by: PHYSICAL MEDICINE & REHABILITATION

## 2022-04-14 NOTE — PROGRESS NOTES
Daily Note     Today's date: 2022  Patient name: Nimesh Styles  : 1985  MRN: [id number]  Referring provider: Tayler Shore DO  Dx:   Encounter Diagnosis     ICD-10-CM    1  Left leg pain  M79 605    2  Chronic right hip pain  M25 551     G89 29    3  Dog bite of buttock, right, sequela  S31 815S     W54  0XXS    4  Dog bite of left lower leg with infection, sequela  S81 852S     L08 9     W54  0XXS                   Subjective: Able to accommodate for condensed session  Patient notes continued soreness into L calf  Objective: See treatment diary below    Assessment: Tolerated treatment well  Positive tissue response to IASTM  Patient would benefit from continued PT  Plan: Continue per plan of care        Precautions: multiple dog bites 2 years ago; high irritability; marijuana use     Manuals 4/14  3/3 3/15 3/24 3/29 4/5 4/7   R hip PROM           L ankle PROM           L ankle MREs           IASTM LH    KP 10' MM 10' KP 10' KP 8'   L ankle isometrics           Neuro Re-Ed           Toe Curls/Spreads           Short Foot Holds           Prone hip ext           Single Leg Bridge - Hug other leg           Clamshells           Hip CAR           Total Gym Sidelying           Wobble Board  20x ea  20x a/p m/l 20x a/p m/l 20x a/p m/l 20x a/p m/l 20x a/p m/l 20x a/p m/l   Weight shifting           SLS   6x10" blue TB foam 6x10" biodex foam 6x10" biodex foam 6x10" biodex foam 6x10" biodex foam 6x10" biodex foam   Ther Ex           Treadmill 8' 7% incline 1 1 mph  5' 7% incline 5' 7% inc 10' 7% inc 10' 7% inc 10' 7% inc 8' 7% inc   Ankle circles            BAPS seated           4 way ankle   30x ea gtb 30x ea gtb 30x ea gtb 30x ea gtb np    Seated heel raises/toe raises   3x15 12# 3x15 15# 4x15 15# 4x15 15# 4x15 15# 4x15 15#   Sidestepping           Sit to Stands 2x10 15#  3x10 15# kb 3x10 15# kb 3x10 15#  3x10 15# 3x10 15# 3x10 15#   Active straight leg raise            Standing gastroc stretch 4x20" prostretch  4x20" prostretch 4x20" prostretch 4x20" prostretch 4x20" prostretch 4x20" prostretch 4x20" prostretch   [facility]            Ther Activity           Step ups                      Gait Training           Alter G                      Modalities

## 2022-04-21 ENCOUNTER — APPOINTMENT (OUTPATIENT)
Dept: PHYSICAL THERAPY | Facility: REHABILITATION | Age: 37
End: 2022-04-21
Payer: COMMERCIAL

## 2022-04-26 ENCOUNTER — APPOINTMENT (OUTPATIENT)
Dept: PHYSICAL THERAPY | Facility: REHABILITATION | Age: 37
End: 2022-04-26
Payer: COMMERCIAL

## 2022-04-27 NOTE — PROGRESS NOTES
D/c skilled PT due to non-compliance with attendance policy  Pt has 3 cx/3 no-shows in last 6 appointments

## 2022-04-28 ENCOUNTER — APPOINTMENT (OUTPATIENT)
Dept: PHYSICAL THERAPY | Facility: REHABILITATION | Age: 37
End: 2022-04-28
Payer: COMMERCIAL

## 2022-05-10 ENCOUNTER — TELEPHONE (OUTPATIENT)
Dept: INTERNAL MEDICINE CLINIC | Facility: CLINIC | Age: 37
End: 2022-05-10

## 2022-05-10 ENCOUNTER — OFFICE VISIT (OUTPATIENT)
Dept: INTERNAL MEDICINE CLINIC | Facility: CLINIC | Age: 37
End: 2022-05-10

## 2022-05-10 VITALS
WEIGHT: 144.2 LBS | HEART RATE: 81 BPM | OXYGEN SATURATION: 97 % | BODY MASS INDEX: 22.58 KG/M2 | TEMPERATURE: 97.9 F | SYSTOLIC BLOOD PRESSURE: 104 MMHG | DIASTOLIC BLOOD PRESSURE: 64 MMHG

## 2022-05-10 DIAGNOSIS — M79.2 NEUROPATHIC PAIN OF LOWER EXTREMITY, LEFT: Primary | ICD-10-CM

## 2022-05-10 DIAGNOSIS — F41.9 ANXIETY AND DEPRESSION: ICD-10-CM

## 2022-05-10 DIAGNOSIS — F32.A ANXIETY AND DEPRESSION: ICD-10-CM

## 2022-05-10 PROCEDURE — 99213 OFFICE O/P EST LOW 20 MIN: CPT | Performed by: INTERNAL MEDICINE

## 2022-05-10 RX ORDER — CYCLOBENZAPRINE HCL 10 MG
10 TABLET ORAL 3 TIMES DAILY PRN
Qty: 90 TABLET | Refills: 1 | Status: SHIPPED | OUTPATIENT
Start: 2022-05-10 | End: 2022-07-20

## 2022-05-10 RX ORDER — DULOXETIN HYDROCHLORIDE 60 MG/1
60 CAPSULE, DELAYED RELEASE ORAL DAILY
Qty: 90 CAPSULE | Refills: 0 | Status: SHIPPED | OUTPATIENT
Start: 2022-05-10 | End: 2022-08-08

## 2022-05-10 NOTE — TELEPHONE ENCOUNTER
Folder Color-Red    Name of [facility] Physician Verification    Form to be filled out by-Dr Carol Shultz    Form to be  picked up patient    Patient made aware of 10 business day policy

## 2022-05-10 NOTE — PATIENT INSTRUCTIONS
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
## 2022-05-11 NOTE — TELEPHONE ENCOUNTER
Form completed by Dr Marion Teixeira and placed in RED clerical folder to be faxed and scanned to patient's chart

## 2022-05-12 ENCOUNTER — PATIENT OUTREACH (OUTPATIENT)
Dept: INTERNAL MEDICINE CLINIC | Facility: CLINIC | Age: 37
End: 2022-05-12
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
## 2022-05-20 ENCOUNTER — OFFICE VISIT (OUTPATIENT)
Dept: PAIN MEDICINE | Facility: CLINIC | Age: 37
End: 2022-05-20
Payer: COMMERCIAL

## 2022-05-20 VITALS — WEIGHT: 145 LBS | HEIGHT: 67 IN | BODY MASS INDEX: 22.76 KG/M2

## 2022-05-20 DIAGNOSIS — M79.2 NEUROPATHIC PAIN OF LOWER EXTREMITY, LEFT: Primary | ICD-10-CM

## 2022-05-20 DIAGNOSIS — M79.2 NEUROPATHIC PAIN: ICD-10-CM

## 2022-05-20 PROCEDURE — 99214 OFFICE O/P EST MOD 30 MIN: CPT | Performed by: NURSE PRACTITIONER

## 2022-05-20 RX ORDER — PREGABALIN 50 MG/1
50 CAPSULE ORAL 3 TIMES DAILY
Qty: 90 CAPSULE | Refills: 1 | Status: SHIPPED | OUTPATIENT
Start: 2022-05-20 | End: 2022-07-01

## 2022-05-20 NOTE — PATIENT INSTRUCTIONS
Gabapentin:  Decrease to 2 capsules in the morning, 1 capsule in the afternoon and 2 capsules at bedtime x 3 days  Then decrease to 1 capsule in the morning, 1 capsule in the afternoon and 2 capsules at bedtime x 3 days  Then decrease to 1 capsule three times a day x 3 days  At this time, stop gabapentin and start pregabalin 50mg TID and continue on this dose until next office visit

## 2022-05-20 NOTE — PROGRESS NOTES
Assessment:  1  Neuropathic pain of lower extremity, left    2  Neuropathic pain        Plan:  1  Will discontinue gabapentin and trial pregabalin 50 mg 3 times a day for neuropathic complaints  I discussed with the patient the type of medication it is, how it works, and that it requires a titration process that is specific to each individual  I reviewed with the patient that it may take 3-4 weeks for the medication's effects to be noticed and that it should never be abruptly stopped  Possible side effects include but are not limited to; vertigo, lethargy, nausea, and edema of the extremities  Advised the patient to call our office if they experience any side effects  The patient verbalized an understanding      2  Patient will move forward with EMG as scheduled  3  Patient may continue duloxetine as prescribed  4  Medical marijuana per certified prescriber  5  The patient will follow-up in 6 weeks or sooner if needed     [employer] software was used to dictate this note  It may contain errors with dictating incorrect words or incorrect spelling  Please contact the provider directly with any questions  History of Present Illness: The patient is a 30 y o  male last seen on 3/25/22 who presents for a follow up office visit in regards to chronic right buttock pain and left calf pain after being bit by a dog in 2019  He does have some numbness and tingling in both regions as well  He has completed physical therapy with variable relief  He is taking gabapentin 600 mg 3 times a day, and duloxetine 60 mg daily with about 20% improvement of his pain without side effects  He also does utilize medical marijuana with some relief  He is scheduled for an EMG in June    The patient rates his pain currently an 8 5/10 on the numeric pain rating scale    He constantly has pain in the evening and at night which is described as dull aching, throbbing, cramping and pins and needles    I have personally reviewed and/or updated the patient's past medical history, past surgical history, family history, social history, current medications, allergies, and vital signs today  Review of Systems:    Review of Systems   Respiratory: Negative for shortness of breath  Cardiovascular: Negative for chest pain  Gastrointestinal: Negative for constipation, diarrhea, nausea and vomiting  Musculoskeletal: Negative for arthralgias, gait problem, joint swelling and myalgias  Skin: Negative for rash  Neurological: Negative for dizziness, seizures and weakness  All other systems reviewed and are negative  Past Medical History:   Diagnosis Date    Marijuana use        History reviewed  No pertinent surgical history      Family History   Problem Relation Age of Onset    No Known Problems Mother     No Known Problems Daughter        Social History     Occupational History    Occupation: unemployed   Tobacco Use    Smoking status: Never Smoker    Smokeless tobacco: Never Used   Vaping Use    Vaping Use: Never used   Substance and Sexual Activity    Alcohol use: Yes     Comment: occasionally    Drug use: Yes     Types: Marijuana     Comment: Medical marijuana    Sexual activity: Yes     Partners: Female     Birth control/protection: Condom Male     Comment: 1 partner only at this time         Current Outpatient Medications:     cyclobenzaprine (FLEXERIL) 10 mg tablet, Take 1 tablet (10 mg total) by mouth 3 (three) times a day as needed for muscle spasms, Disp: 90 tablet, Rfl: 1    DULoxetine (CYMBALTA) 60 mg delayed release capsule, Take 1 capsule (60 mg total) by mouth daily Start taking in 1-2 weeks after taking gabapentin 3 times per day, Disp: 90 capsule, Rfl: 0    Multiple Vitamin (multivitamin) tablet, Take 1 tablet by mouth daily, Disp: 90 tablet, Rfl: 3    pregabalin (LYRICA) 50 mg capsule, Take 1 capsule (50 mg total) by mouth in the morning and 1 capsule (50 mg total) in the evening and 1 capsule (50 mg total) before bedtime  , Disp: 90 capsule, Rfl: 1    No Known Allergies    Physical Exam:    Ht 5' 7" (1 702 m)   Wt 65 8 kg (145 lb)   BMI 22 71 kg/m²     Constitutional:normal, well developed, well nourished, alert, in no distress and non-toxic and no overt pain behavior  Eyes:anicteric  HEENT:grossly intact  Neck:supple, symmetric, trachea midline and no masses   Pulmonary:even and unlabored  Cardiovascular:No edema or pitting edema present  Skin:Normal without rashes or lesions and well hydrated  Psychiatric:Mood and affect appropriate  Neurologic:Cranial Nerves II-XII grossly intact  Musculoskeletal:normal gait  Will heal dog bite scars on the posterolateral calf of the left leg and right buttock  Decreased range of motion with left dorsiflexion and plantar flexion      Imaging  No orders to display         No orders of the defined types were placed in this encounter

## 2022-06-28 ENCOUNTER — HOSPITAL ENCOUNTER (OUTPATIENT)
Dept: NEUROLOGY | Facility: CLINIC | Age: 37
Discharge: HOME | End: 2022-06-28
Payer: COMMERCIAL

## 2022-06-28 DIAGNOSIS — G57.93 NEUROPATHIC PAIN OF BOTH LEGS: ICD-10-CM

## 2022-06-28 PROCEDURE — 95886 MUSC TEST DONE W/N TEST COMP: CPT | Performed by: PSYCHIATRY & NEUROLOGY

## 2022-06-28 PROCEDURE — 95910 NRV CNDJ TEST 7-8 STUDIES: CPT | Performed by: PSYCHIATRY & NEUROLOGY

## 2022-07-01 ENCOUNTER — OFFICE VISIT (OUTPATIENT)
Dept: PAIN MEDICINE | Facility: CLINIC | Age: 37
End: 2022-07-01
Payer: COMMERCIAL

## 2022-07-01 VITALS
HEIGHT: 67 IN | HEART RATE: 103 BPM | SYSTOLIC BLOOD PRESSURE: 109 MMHG | DIASTOLIC BLOOD PRESSURE: 75 MMHG | BODY MASS INDEX: 22.71 KG/M2

## 2022-07-01 DIAGNOSIS — G57.93 NEUROPATHIC PAIN OF BOTH LEGS: Primary | ICD-10-CM

## 2022-07-01 PROCEDURE — 99214 OFFICE O/P EST MOD 30 MIN: CPT | Performed by: NURSE PRACTITIONER

## 2022-07-01 RX ORDER — PREGABALIN 75 MG/1
CAPSULE ORAL
Qty: 90 CAPSULE | Refills: 1 | Status: SHIPPED | OUTPATIENT
Start: 2022-07-01 | End: 2022-10-26

## 2022-07-01 NOTE — PROGRESS NOTES
Assessment:  1  Neuropathic pain of both legs        Plan:  1  I will increase pregabalin to 75mg 3 times a day for ongoing neuropathic complaints in the left lower extremity and right buttock  I advised the patient that if they experience any side effects or issues with the changes in their medication regiment, they should give our office a call to discuss  I also advised the patient not to drive or operate machinery until they see how the changes in the medication regimen affects them  The patient was agreeable and verbalized an understanding  2  Patient may continue duloxetine as prescribed  3  Medical marijuana per certified prescriber   4  Patient will follow-up in 8 weeks or sooner if needed      M*Modal software was used to dictate this note  It may contain errors with dictating incorrect words or incorrect spelling  Please contact the provider directly with any questions  History of Present Illness: The patient is a 30 y o  male last seen on 5/20/22 who presents for a follow up office visit in regards to chronic right buttock and left calf pain after being bit by a dog and 2019  He complains of numbness and tingling in both regions  EMG of the bilateral lower extremities is unremarkable  He has completed physical therapy with variable relief  He is taking pregabalin 50 mg 3 times a day duloxetine 60 mg daily with a 50% improvement of his pain without side effects  He also utilizes medical marijuana with some relief    Patient rates his pain at 8/10 on the numerical pain rating scale  He states the pain is intermittent in nature and bothersome in the evening  He characterizes the pain as sharp, throbbing, cramping and shooting    I have personally reviewed and/or updated the patient's past medical history, past surgical history, family history, social history, current medications, allergies, and vital signs today         Review of Systems:    Review of Systems   Respiratory: Negative for shortness of breath  Cardiovascular: Negative for chest pain  Gastrointestinal: Negative for constipation, diarrhea, nausea and vomiting  Musculoskeletal: Negative for arthralgias, gait problem, joint swelling and myalgias  Skin: Negative for rash  Neurological: Negative for dizziness, seizures and weakness  All other systems reviewed and are negative  Past Medical History:   Diagnosis Date    Marijuana use        History reviewed  No pertinent surgical history  Family History   Problem Relation Age of Onset    No Known Problems Mother     No Known Problems Daughter        Social History     Occupational History    Occupation: unemployed   Tobacco Use    Smoking status: Never Smoker    Smokeless tobacco: Never Used   Vaping Use    Vaping Use: Never used   Substance and Sexual Activity    Alcohol use: Yes     Comment: occasionally    Drug use: Yes     Types: Marijuana     Comment: Medical marijuana    Sexual activity: Yes     Partners: Female     Birth control/protection: Condom Male     Comment: 1 partner only at this time         Current Outpatient Medications:     cyclobenzaprine (FLEXERIL) 10 mg tablet, Take 1 tablet (10 mg total) by mouth 3 (three) times a day as needed for muscle spasms, Disp: 90 tablet, Rfl: 1    DULoxetine (CYMBALTA) 60 mg delayed release capsule, Take 1 capsule (60 mg total) by mouth daily Start taking in 1-2 weeks after taking gabapentin 3 times per day, Disp: 90 capsule, Rfl: 0    Multiple Vitamin (multivitamin) tablet, Take 1 tablet by mouth daily, Disp: 90 tablet, Rfl: 3    pregabalin (LYRICA) 75 mg capsule, Take 1 PO AM and 2 PO HS, Disp: 90 capsule, Rfl: 1    No Known Allergies    Physical Exam:    /75   Pulse 103   Ht 5' 7" (1 702 m)   BMI 22 71 kg/m²     Constitutional:normal, well developed, well nourished, alert, in no distress and non-toxic and no overt pain behavior    Eyes:anicteric  HEENT:grossly intact  Neck:supple, symmetric, trachea midline and no masses   Pulmonary:even and unlabored  Cardiovascular:No edema or pitting edema present  Skin:Normal without rashes or lesions and well hydrated  Psychiatric:Mood and affect appropriate  Neurologic:Cranial Nerves II-XII grossly intact  Musculoskeletal:normal gait  Slight decreased range of motion all planes of the left foot  Imaging  No orders to display         No orders of the defined types were placed in this encounter

## 2022-07-12 NOTE — TELEPHONE ENCOUNTER
Folder Color- Red    Name of [address] Physician Verification    Form to be filled out by- Dr Rocael Smith    Form to be Faxed [phone]    Patient made aware of 10 business day policy      Patient came into the office stating the courts need an updated Physician Verification form

## 2022-07-20 DIAGNOSIS — M79.2 NEUROPATHIC PAIN OF LOWER EXTREMITY, LEFT: ICD-10-CM

## 2022-07-20 RX ORDER — CYCLOBENZAPRINE HCL 10 MG
TABLET ORAL
Qty: 90 TABLET | Refills: 1 | Status: SHIPPED | OUTPATIENT
Start: 2022-07-20 | End: 2022-10-04 | Stop reason: SDUPTHER

## 2022-07-27 ENCOUNTER — OFFICE VISIT (OUTPATIENT)
Dept: INTERNAL MEDICINE CLINIC | Facility: CLINIC | Age: 37
End: 2022-07-27

## 2022-07-27 VITALS
HEIGHT: 67 IN | TEMPERATURE: 98.3 F | SYSTOLIC BLOOD PRESSURE: 105 MMHG | HEART RATE: 92 BPM | BODY MASS INDEX: 24.64 KG/M2 | DIASTOLIC BLOOD PRESSURE: 71 MMHG | WEIGHT: 157 LBS

## 2022-07-27 DIAGNOSIS — F41.9 ANXIETY AND DEPRESSION: ICD-10-CM

## 2022-07-27 DIAGNOSIS — M79.2 NEUROPATHIC PAIN OF LOWER EXTREMITY, LEFT: ICD-10-CM

## 2022-07-27 DIAGNOSIS — F32.A ANXIETY AND DEPRESSION: ICD-10-CM

## 2022-07-27 DIAGNOSIS — Z00.00 ANNUAL PHYSICAL EXAM: Primary | ICD-10-CM

## 2022-07-27 PROCEDURE — 99395 PREV VISIT EST AGE 18-39: CPT | Performed by: INTERNAL MEDICINE

## 2022-07-27 RX ORDER — CITALOPRAM 20 MG/1
20 TABLET ORAL EVERY MORNING
COMMUNITY
Start: 2022-06-22 | End: 2022-10-04 | Stop reason: SDUPTHER

## 2022-07-27 RX ORDER — HYDROXYZINE PAMOATE 50 MG/1
50 CAPSULE ORAL
COMMUNITY
Start: 2022-06-22 | End: 2022-10-04 | Stop reason: SDUPTHER

## 2022-07-27 NOTE — TELEPHONE ENCOUNTER
Patient was a no show  Patient will need to r/s form will remain in RED clinical folder   Clerical please contact patient to r/s

## 2022-07-27 NOTE — PATIENT INSTRUCTIONS

## 2022-07-27 NOTE — PROGRESS NOTES
[address] [facility]    NAME: Brooklynn Given  AGE: 30 y o  SEX: male  : 1985     DATE: 2022     Assessment and Plan:     Problem List Items Addressed This Visit    None     Visit Diagnoses     Annual physical exam    -  Primary    Anxiety and depression      Follows up with behavioral health once a week  Relevant Medications    hydrOXYzine pamoate (VISTARIL) 50 mg capsule    citalopram (CeleXA) 20 mg tablet    Neuropathic pain of lower extremity, left      Secondary to dog bite in 2019 resulting in right sciatic and left peroneal pain  Recent EMG showed no clear evidence of acute denervation or innervation changes, however, test limited to pain and difficulty tolerating test   Cymbalta, Lyrica and Flexeril per Pain management  Form for child support filled today  Given fluctuating pain of left calf and right buttock, unable to determine whether patient will be able to work in the future or not  Immunizations and preventive care screenings were discussed with patient today  Appropriate education was printed on patient's after visit summary  Counseling:  Alcohol/drug use: discussed moderation in alcohol intake, the recommendations for healthy alcohol use, and avoidance of illicit drug use  Dental Health: discussed importance of regular tooth brushing, flossing, and dental visits  Injury prevention: discussed safety/seat belts, safety helmets, smoke detectors, carbon dioxide detectors, and smoking near bedding or upholstery  Sexual health: discussed sexually transmitted diseases, partner selection, use of condoms, avoidance of unintended pregnancy, and contraceptive alternatives  · Exercise: the importance of regular exercise/physical activity was discussed  Recommend exercise 3-5 times per week for at least 30 minutes            Return in about 1 year (around 2023) for Annual physical      Chief Complaint:     Chief Complaint   Patient presents with    Physical Exam      History of Present Illness:     Adult Annual Physical   Patient here for a comprehensive physical exam  The patient reports problems - neuropathic pain of right buttock and left calf since dog attack in 2019 for which he follows up with pain managment  Patient is currently taking Lyrica, Cymbalta and Flexeril  Patient reports that is able to walk but no from long distances and cant sit for a long time either  Patient requesting letter for child support to be filled today       Diet and Physical Activity  · Diet/Nutrition: well balanced diet  · Exercise: no formal exercise  Depression Screening  PHQ-2/9 Depression Screening    Little interest or pleasure in doing things: 0 - not at all  Feeling down, depressed, or hopeless: 1 - several days  PHQ-2 Score: 1  PHQ-2 Interpretation: Negative depression screen       General Health  · Sleep: sleeps poorly and gets 4-6 hours of sleep on average  · Hearing: normal - bilateral   · Vision: no vision problems  · Dental: regular dental visits  [facility]  · History of STDs?: no      Review of Systems:     Review of Systems   Constitutional: Negative for chills and fever  HENT: Negative for ear pain and sore throat  Eyes: Negative for pain and visual disturbance  Respiratory: Negative for cough and shortness of breath  Cardiovascular: Negative for chest pain and palpitations  Gastrointestinal: Negative for abdominal pain and vomiting  Genitourinary: Negative for dysuria and hematuria  Musculoskeletal: Positive for gait problem (left calf pain and right buttock pain)  Negative for arthralgias and back pain  Skin: Negative for color change and rash  Neurological: Negative for seizures and syncope  All other systems reviewed and are negative       Past Medical History:     Past Medical History:   Diagnosis Date    Marijuana use       Past Surgical History:     History reviewed  No pertinent surgical history  Social History:     Social History     Socioeconomic History    Marital status: Single     Spouse name: None    Number of children: 1    Years of education: None    Highest education level: None   Occupational History    Occupation: unemployed   Tobacco Use    Smoking status: Never Smoker    Smokeless tobacco: Never Used   Vaping Use    Vaping Use: Never used   Substance and Sexual Activity    Alcohol use: Yes     Comment: occasionally    Drug use: Yes     Types: Marijuana     Comment: Medical marijuana    Sexual activity: Yes     Partners: Female     Birth control/protection: Condom Male     Comment: 1 partner only at this time   Other Topics Concern    None   Social History Narrative    None     Social Determinants of Health     Financial Resource Strain: Medium Risk    Difficulty of Paying Living Expenses: Somewhat hard   Food Insecurity: No Food Insecurity    Worried About Running Out of Food in the Last Year: Never true    Eldon of Food in the Last Year: Never true   Transportation Needs: No Transportation Needs    Lack of Transportation (Medical): No    Lack of Transportation (Non-Medical):  No   Physical Activity: Insufficiently Active    Days of Exercise per Week: 2 days    Minutes of Exercise per Session: 60 min   Stress: Not on file   Social Connections: Not on file   Intimate Partner Violence: Not on file   Housing Stability: Low Risk     Unable to Pay for Housing in the Last Year: No    Number of Places Lived in the Last Year: 1    Unstable Housing in the Last Year: No      Family History:     Family History   Problem Relation Age of Onset    No Known Problems Mother     No Known Problems Daughter       Current Medications:     Current Outpatient Medications   Medication Sig Dispense Refill    citalopram (CeleXA) 20 mg tablet Take 20 mg by mouth every morning      cyclobenzaprine (FLEXERIL) 10 mg tablet TAKE 1 TABLET BY MOUTH THREE TIMES A DAY AS NEEDED FOR MUSCLE SPASMS 90 tablet 1    DULoxetine (CYMBALTA) 60 mg delayed release capsule Take 1 capsule (60 mg total) by mouth daily Start taking in 1-2 weeks after taking gabapentin 3 times per day 90 capsule 0    hydrOXYzine pamoate (VISTARIL) 50 mg capsule Take 50 mg by mouth daily at bedtime      Multiple Vitamin (multivitamin) tablet Take 1 tablet by mouth daily 90 tablet 3    pregabalin (LYRICA) 75 mg capsule Take 1 PO AM and 2 PO HS 90 capsule 1     No current facility-administered medications for this visit  Allergies:     No Known Allergies   Physical Exam:     /71 (BP Location: Right arm, Patient Position: Sitting, Cuff Size: Adult)   Pulse 92   Temp 98 3 °F (36 8 °C) (Temporal)   Ht 5' 7" (1 702 m)   Wt 71 2 kg (157 lb)   BMI 24 59 kg/m²     Physical Exam  Vitals and nursing note reviewed  Constitutional:       General: He is not in acute distress  Appearance: Normal appearance  He is well-developed  HENT:      Head: Normocephalic and atraumatic  Eyes:      Conjunctiva/sclera: Conjunctivae normal    Cardiovascular:      Rate and Rhythm: Normal rate and regular rhythm  Heart sounds: No murmur heard  Pulmonary:      Effort: Pulmonary effort is normal  No respiratory distress  Breath sounds: Normal breath sounds  Abdominal:      Palpations: Abdomen is soft  Tenderness: There is no abdominal tenderness  Musculoskeletal:      Cervical back: Neck supple  Skin:     General: Skin is warm and dry  Comments: Hypersensitivity of left calf region   Neurological:      Mental Status: He is alert            Alferd Cushing Ramos-Feliciano, MD   [address]

## 2022-10-03 DIAGNOSIS — M79.2 NEUROPATHIC PAIN OF LOWER EXTREMITY, LEFT: ICD-10-CM

## 2022-10-03 RX ORDER — HYDROXYZINE PAMOATE 50 MG/1
50 CAPSULE ORAL
Qty: 30 CAPSULE | Status: CANCELLED | OUTPATIENT
Start: 2022-10-03

## 2022-10-03 RX ORDER — CYCLOBENZAPRINE HCL 10 MG
10 TABLET ORAL 3 TIMES DAILY PRN
Qty: 90 TABLET | Refills: 1 | Status: CANCELLED | OUTPATIENT
Start: 2022-10-03

## 2022-10-04 ENCOUNTER — OFFICE VISIT (OUTPATIENT)
Dept: INTERNAL MEDICINE CLINIC | Facility: CLINIC | Age: 37
End: 2022-10-04

## 2022-10-04 VITALS
HEART RATE: 88 BPM | WEIGHT: 153 LBS | HEIGHT: 67 IN | BODY MASS INDEX: 24.01 KG/M2 | TEMPERATURE: 98.2 F | OXYGEN SATURATION: 98 % | DIASTOLIC BLOOD PRESSURE: 62 MMHG | SYSTOLIC BLOOD PRESSURE: 92 MMHG

## 2022-10-04 DIAGNOSIS — F43.10 PTSD (POST-TRAUMATIC STRESS DISORDER): Primary | ICD-10-CM

## 2022-10-04 DIAGNOSIS — M79.2 NEUROPATHIC PAIN OF LOWER EXTREMITY, LEFT: ICD-10-CM

## 2022-10-04 DIAGNOSIS — G57.93 NEUROPATHIC PAIN OF BOTH LEGS: ICD-10-CM

## 2022-10-04 DIAGNOSIS — G89.29 CHRONIC BILATERAL THORACIC BACK PAIN: ICD-10-CM

## 2022-10-04 DIAGNOSIS — M54.6 CHRONIC BILATERAL THORACIC BACK PAIN: ICD-10-CM

## 2022-10-04 DIAGNOSIS — Z13.1 SCREENING FOR DIABETES MELLITUS (DM): ICD-10-CM

## 2022-10-04 PROCEDURE — 99213 OFFICE O/P EST LOW 20 MIN: CPT | Performed by: INTERNAL MEDICINE

## 2022-10-04 RX ORDER — CITALOPRAM 20 MG/1
20 TABLET ORAL EVERY MORNING
Qty: 90 TABLET | Refills: 0 | Status: SHIPPED | OUTPATIENT
Start: 2022-10-04 | End: 2023-01-02

## 2022-10-04 RX ORDER — HYDROXYZINE PAMOATE 50 MG/1
50 CAPSULE ORAL
Qty: 90 CAPSULE | Refills: 0 | Status: SHIPPED | OUTPATIENT
Start: 2022-10-04 | End: 2023-01-02

## 2022-10-04 RX ORDER — DULOXETIN HYDROCHLORIDE 60 MG/1
60 CAPSULE, DELAYED RELEASE ORAL DAILY
Qty: 90 CAPSULE | Refills: 0 | Status: SHIPPED | OUTPATIENT
Start: 2022-10-04

## 2022-10-04 RX ORDER — BUSPIRONE HYDROCHLORIDE 15 MG/1
15 TABLET ORAL 3 TIMES DAILY
COMMUNITY
Start: 2022-09-02

## 2022-10-04 RX ORDER — CYCLOBENZAPRINE HCL 10 MG
10 TABLET ORAL 3 TIMES DAILY PRN
Qty: 90 TABLET | Refills: 1 | Status: SHIPPED | OUTPATIENT
Start: 2022-10-04 | End: 2022-10-26 | Stop reason: SDUPTHER

## 2022-10-04 NOTE — PROGRESS NOTES
[facility] Visit Note  Caryn Vu 30 y o  male   MRN: [id number]    Assessment and Plan    Diagnoses and all orders for this visit:    Chronic pain syndrome  Advised patient to follow up w/pain management for long-term refills and refill of controlled substances eg lyrica  Explained to patient that working through his PTSD will be his greatest objective to overcome his pain as it is most likely stemming from the trauma of the attack  Has tried PT for lower legs in past which mostly worsened his sx, reinforcing the notion that patient has pain out of proportion to status of injury/healing  EMG was also negative for acute denervation, further supporting this hypothesis  - Refilled meds as below  Rest per pain management and psych  Will get T/L spine PT for MSK spasm/guarding 2/2 antalgic gait    PTSD (post-traumatic stress disorder)  -    Refill: hydrOXYzine pamoate (VISTARIL) 50 mg capsule; Take 1 capsule (50 mg total) by mouth daily at bedtime  Follow up w/psych for further management, med changes/refills  Has weekly visits scheduled w/psych and therapy    Neuropathic pain of both legs  -     citalopram (CeleXA) 20 mg tablet; Take 1 tablet (20 mg total) by mouth every morning  Will check A1C to r/o DM as confounding etiology      Neuropathic pain of lower extremity, left  -     citalopram (CeleXA) 20 mg tablet; Take 1 tablet (20 mg total) by mouth every morning  -     DULoxetine (CYMBALTA) 60 mg delayed release capsule; Take 1 capsule (60 mg total) by mouth daily  -     cyclobenzaprine (FLEXERIL) 10 mg tablet;  Take 1 tablet (10 mg total) by mouth 3 (three) times a day as needed for muscle spasms  - Will check A1C to r/o DM component; last CBC in 2020 [facility] less suggestive of B12 deficiency, 2018 B12 was WNL in 500s,  will not order at this time    Chronic bilateral thoracic back pain  Emphasized importance of psychotherapy + physical therapy to help recalibrate patient's pain sensitivity and prevent further injuries with PT    -     Ambulatory Referral to Physical Therapy; Future    Screening for diabetes mellitus (DM)  -     Hemoglobin A1C; Future    Other orders  -     Refill: busPIRone (BUSPAR) 15 mg tablet; Take 15 mg by mouth 3 (three) times a day          Health Maintenance:  Ordered A1C to screen for DM2 in setting of sx neuropathy, no A1C on file        Schedule a follow-up appointment in 1 year for annual physical      Chief Complaint: Acute on chronic LLE pain  Subjective     History of Present Illness:  Patient is a 13year-old male history of disseminated gonorrhea now resolved bilateral lower extremity neuropathy, chronic pain syndrome a PTSD from dog bite in 2019 presenting with acutely worsening pain left lower extremity  Patient was deliberately attacked by a pit bull in 2019  He sustained multiple, deep bite wounds over his bilateral extremities, including his left ankle and R buttocks w/significant scarring  Wounds have since healed but patient has significant PTSD and consequent pain related to the episode  He sees pain management and psychiatry for which he is on buspar 15 mg TID, citalopram 20 mg daily, flexeril 10 mg TID PRN, duloxetine 60 mg qD, hydroxyzine 50 mg qHS, and lyrics 75 mg 75/150 AM/PM daily  Patient states his LLE pain has been flaring up more than usual w/sharp, stabbing pain in L gastrocnemius, but he has been running low on some of his medicines  He is also now reporting that because of his antalgic gait, he is developing back pain and has muscle spasms in the lower and upper back  Review of Systems   Constitutional: Negative for chills, fatigue and fever  Respiratory: Negative for cough and shortness of breath  Cardiovascular: Negative for chest pain, palpitations and leg swelling  Gastrointestinal: Negative for nausea and vomiting  Genitourinary: Negative for difficulty urinating     Skin: Negative for rash  Psychiatric/Behavioral: Positive for dysphoric mood and sleep disturbance (nightmares regarding dog attack, occur at least weekly)  Negative for self-injury and suicidal ideas  Continues to have flashbacks when waking through areas similar in character/appearance to where the attack happened, endorses a generalized fear of large dogs             Current Outpatient Medications:     busPIRone (BUSPAR) 15 mg tablet, Take 15 mg by mouth 3 (three) times a day, Disp: , Rfl:     citalopram (CeleXA) 20 mg tablet, Take 1 tablet (20 mg total) by mouth every morning, Disp: 90 tablet, Rfl: 0    cyclobenzaprine (FLEXERIL) 10 mg tablet, Take 1 tablet (10 mg total) by mouth 3 (three) times a day as needed for muscle spasms, Disp: 90 tablet, Rfl: 1    DULoxetine (CYMBALTA) 60 mg delayed release capsule, Take 1 capsule (60 mg total) by mouth daily, Disp: 90 capsule, Rfl: 0    hydrOXYzine pamoate (VISTARIL) 50 mg capsule, Take 1 capsule (50 mg total) by mouth daily at bedtime, Disp: 90 capsule, Rfl: 0    Multiple Vitamin (multivitamin) tablet, Take 1 tablet by mouth daily, Disp: 90 tablet, Rfl: 3    pregabalin (LYRICA) 75 mg capsule, Take 1 PO AM and 2 PO HS, Disp: 90 capsule, Rfl: 1  Past Medical History:   Diagnosis Date    Marijuana use      History reviewed  No pertinent surgical history    Social History     Socioeconomic History    Marital status: Single     Spouse name: Not on file    Number of children: 1    Years of education: Not on file    Highest education level: Not on file   Occupational History    Occupation: unemployed   Tobacco Use    Smoking status: Never Smoker    Smokeless tobacco: Never Used   Vaping Use    Vaping Use: Never used   Substance and Sexual Activity    Alcohol use: Yes     Comment: occasionally    Drug use: Yes     Types: Marijuana     Comment: Medical marijuana    Sexual activity: Yes     Partners: Female     Birth control/protection: Condom Male     Comment: 1 partner only at this time   Other Topics Concern    Not on file   Social History Narrative    Not on file     Social Determinants of Health     Financial Resource Strain: Medium Risk    Difficulty of Paying Living Expenses: Somewhat hard   Food Insecurity: No Food Insecurity    Worried About Running Out of Food in the Last Year: Never true    Eldon of Food in the Last Year: Never true   Transportation Needs: No Transportation Needs    Lack of Transportation (Medical): No    Lack of Transportation (Non-Medical): No   Physical Activity: Insufficiently Active    Days of Exercise per Week: 2 days    Minutes of Exercise per Session: 60 min   Stress: Not on file   Social Connections: Not on file   Intimate Partner Violence: Not on file   Housing Stability: Low Risk     Unable to Pay for Housing in the Last Year: No    Number of Places Lived in the Last Year: 1    Unstable Housing in the Last Year: No     Family History   Problem Relation Age of Onset    No Known Problems Mother     No Known Problems Daughter      No Known Allergies    Objective     Vitals:    10/04/22 1012   BP: 92/62   BP Location: Right arm   Patient Position: Sitting   Cuff Size: Standard   Pulse: 88   Temp: 98 2 °F (36 8 °C)   TempSrc: Temporal   SpO2: 98%   Weight: 69 4 kg (153 lb)   Height: 5' 7" (1 702 m)       Physical exam:   Physical Exam  Vitals reviewed  Constitutional:       General: He is not in acute distress  Appearance: He is not ill-appearing, toxic-appearing or diaphoretic  HENT:      Head: Normocephalic and atraumatic  Mouth/Throat:      Mouth: Mucous membranes are moist       Pharynx: Oropharynx is clear  Eyes:      General: No scleral icterus  Extraocular Movements: Extraocular movements intact  Cardiovascular:      Rate and Rhythm: Normal rate and regular rhythm  Heart sounds: No murmur heard  No friction rub  No gallop     Pulmonary:      Effort: Pulmonary effort is normal  No respiratory distress  Breath sounds: No wheezing or rales  Abdominal:      General: There is no distension  Palpations: Abdomen is soft  Tenderness: There is no abdominal tenderness  There is no guarding  Musculoskeletal:         General: No swelling  Normal range of motion  Right lower leg: No edema  Left lower leg: No edema  Skin:     General: Skin is warm and dry  Comments: Well-healed scars scattered across BLE, most prominent at LLE superior to ankle and R buttocks scar appx 4x5 inches  Small 2 cm scar at R hand anatomical snuffbox   Neurological:      Mental Status: He is alert and oriented to person, place, and time  Psychiatric:         Thought Content: Thought content normal          Judgment: Judgment normal       Comments: Aversive gaze, fidgeting with hands, inappropriate affect (hiding frustration behind smiling), appears to be very close to tears, especially when taking history, but cooperative and pleasant              MD Ramy Spear  Internal Medicine PGY-2  [address] [id number] [address]  [facility]    ==  PLEASE NOTE:  This encounter was completed utilizing the M- "[employer] Direct Speech Voice Recognition Software  Grammatical errors, random word insertions, pronoun errors and incomplete sentences are occasional consequences of the system due to software limitations, ambient noise and hardware issues  These may be missed by proof reading prior to affixing electronic signature  Any questions or concerns about the content, text or information contained within the body of this dictation should be directly addressed to the physician for clarification  Please do not hesitate to call me directly if you have any any questions or concerns

## 2022-10-17 ENCOUNTER — EVALUATION (OUTPATIENT)
Dept: PHYSICAL THERAPY | Facility: CLINIC | Age: 37
End: 2022-10-17
Payer: MEDICARE

## 2022-10-17 VITALS — SYSTOLIC BLOOD PRESSURE: 130 MMHG | DIASTOLIC BLOOD PRESSURE: 88 MMHG

## 2022-10-17 DIAGNOSIS — M54.6 CHRONIC BILATERAL THORACIC BACK PAIN: Primary | ICD-10-CM

## 2022-10-17 DIAGNOSIS — G89.29 CHRONIC BILATERAL THORACIC BACK PAIN: Primary | ICD-10-CM

## 2022-10-17 PROCEDURE — 97162 PT EVAL MOD COMPLEX 30 MIN: CPT

## 2022-10-17 NOTE — PROGRESS NOTES
PT Evaluation      Today's date: 10/17/2022  Patient name: Brad Love  : 1985  MRN: [id number]  Referring provider: Jennifer Mendes MD  Dx:   Encounter Diagnosis     ICD-10-CM    1  Chronic bilateral thoracic back pain  M54 6 Ambulatory Referral to Physical Therapy    G89 29         Assessment:   Brad Love  is a pleasant 30 y o  male who presents with thoracic pain, cervical and thoracic muscle restrictions and increased pain  The primary movement problem is decreased thoracic mobility and increased posterior chain spasming  resulting in increased pain and mobility limitation, which limit his ability to lift, reach overhead and maintain prolonged positioning  No referral is necessary at this time based on examination results  The patient's greatest concerns are increased pain and ROM limitation causing difficulty performing ADLS and work  FOTO 27    Problem List:  1) Decreased cerivcal/ thoracic mobility  2) Muscle spasming   3) UE/ scapular strength deficits     Etiologic factors include muscle spasming, decreased stabilizer muscle activation and decreased upper quarter/ lower quarter mobility  Pt  will benefit from skilled PT services that includes manual therapy techniques to enhance tissue extensibility, neuromuscular re-education to facilitate motor control, therapeutic exercise to increase functional mobility, and modalities prn to reduce pain and inflammation        Impairment Goals  - Pt I with initial HEP in 1-2 visits  - Improve ROM equal to contralateral side in 4-6 weeks  - Increase strength to 5/5 in all affected areas in 4-6 weeks    Functional Goals  - Increase Functional Status Measure to >45 for improved functional mobility  - Patient will be independent with comprehensive HEP in 6-8 weeks  - Patient will be able to reach for object from shelf at shoulder height with min reports of difficulty in 2-4 weeks  - Patient will be able to reach for object overhead with min to difficulty in 6-8 weeks  - Patient will be able to lift/carry objects without provocation of symptoms in 6-8 weeks    Prognosis: good- will monitor due to chronicity    POC: Skilled physical therapy is recommended 2x a week for 10 weeks  POC expiration on 12/26/22  Planned interventions include: Therapeutic exercise, therapeutic activity, manual, joint mobilization, neuromuscular re-education, HEP, patient education, hot pack, TENS          Subjective Evaluation    History of Present Illness  Mechanism of injury: trauma  Mechanism of injury: Hayley Heard presents with c/c of thoracic pain   Symptoms began 08/2019 after being attacked by a pit bull causing extensive injuries throughout the glute, buttock and LE  Notes he began walking differently secondary to nerve damage, LE weakness and mobility deficits causing increased strain on the spine  Reports back pain started approximately 3-4 months post attack  Denies numbness/ tingling related to the back      Aggravating factors: walking for 30 minute, sitting causes increased cramping in the glute, unable to lift, L LE movement causes increased thoracic pain   Relieving factors: massages,   24hr pain pattern: 8/10 (current), 6/10 (best), 9/10 (worst), location:R low back and paraspinals descriptors: throbbing, fatigue    Imaging: not on thoracic spine   Previous treatments: massage therapy for low back, PT for LE  Occupation/recreation: not currently working   Primary concern: pain   Patient goals: to get ahold of this pain and at least be able to manage it           Not a recurrent problem   Quality of life: good          Objective     Postural Observations  Seated posture: fair (forward shoulders )        Palpation     Additional Palpation Details  TTP throughout the paraspinals/ QL/ lat on the R with palpable spasming and increased hypersensitivity to touch, TTP in the R levator and UT with referral pain into the head    Active Range of Motion Cervical/Thoracic Spine       Cervical    Flexion: Neck active flexion: pulling on both sides of neck   WFL and with pain  Extension: Neck active extension: on the R side of the neck      WFL and with pain  Left lateral flexion: Neck active lateral bend left: pulling on the R side      WFL Restriction level: minimal  Right lateral flexion:  WFL Restriction level minimal  Left rotation:  Restriction level: minimal  Right rotation: Neck active rotation right: pulling on the R side     with pain Restriction level: minimal    Thoracic    Flexion: Active thoracic flexion: pulling on the R side   with pain Restriction level: moderate  Extension: Active thoracic extension: slight relief of pain on the R side      Restriction level: minimal  Left lateral flexion: Active left thoracic lateral flexion: pulling in the R side     Restriction level: moderate  Right lateral flexion: Active right thoracic lateral flexion: slight pinching    Restriction level: minimal  Left rotation: Active left thoracic rotation: tight throughout the R side   Restriction level: moderate  Right rotation:  WFL  Left Shoulder   Flexion: 180 degrees   Abduction: 180 degrees   External rotation BTH: T4     Right Shoulder   Flexion: 130 degrees with pain  Abduction: 150 (pullingi n lat ) degrees with pain  External rotation BTH: T2   Internal rotation BTB: T7     Additional Active Range of Motion Details  Slight increase in soreness post assessment     Strength/Myotome Testing     Left Shoulder     Planes of Motion   Flexion: 4+   Extension: 4+   Abduction: 4+   External rotation at 0°: 4+   Internal rotation at 0°: 4+     Right Shoulder     Planes of Motion   Flexion: 4 (pain in shoulder and lat)   Extension: 4   Abduction: 4- (pain in the lat and post shoulder)   External rotation at 0°: 4- (pain in post shoulder)   Internal rotation at 0°: 4 (in post shoulder)     Left Elbow   Flexion: 4+  Extension: 4+    Right Elbow   Flexion: 4- (pain in the shoulder)  Extension: 4-             Precautions: hx of dog bites with increased tenderness on buttock, low back and L LE       Manuals 10/17/22            STM/ MFR to R lat/ QL and parspinals              Levator/ UT release                                        Neuro Re-Ed                                                                                                        Ther Ex             Seated pball stretch  HEP            Levator scap stretch  HEP            Upper trap stretch              QL stretch              t band rows             t band ext                                        Ther Activity             UBE                          Gait Training                                       Modalities             Hot pack

## 2022-10-20 ENCOUNTER — OFFICE VISIT (OUTPATIENT)
Dept: PHYSICAL THERAPY | Facility: CLINIC | Age: 37
End: 2022-10-20
Payer: MEDICARE

## 2022-10-20 DIAGNOSIS — G89.29 CHRONIC BILATERAL THORACIC BACK PAIN: Primary | ICD-10-CM

## 2022-10-20 DIAGNOSIS — M54.6 CHRONIC BILATERAL THORACIC BACK PAIN: Primary | ICD-10-CM

## 2022-10-20 PROCEDURE — 97112 NEUROMUSCULAR REEDUCATION: CPT

## 2022-10-20 PROCEDURE — 97110 THERAPEUTIC EXERCISES: CPT

## 2022-10-20 NOTE — PROGRESS NOTES
Daily Note     Today's date: 10/20/2022  Patient name: Caroline Peters  : 1985  MRN: [id number]  Referring provider: Fausto Hagen MD  Dx:   Encounter Diagnosis     ICD-10-CM    1  Chronic bilateral thoracic back pain  M54 6     G89 29        Start Time: 1200  Stop Time: 1250  Total time in clinic (min): 50 minutes    Subjective: Pt presents to PT with reports of moderate pain in the back specifically thoracic pain in between the shoulder blades  Objective: See treatment diary below      Assessment: Tolerated treatment well  Patient demonstrated fatigue post treatment and would benefit from continued PT  Pt performed exercises as noted with no signs of increased pain or adverse symptoms  Pt shows moderate limitations with ROM and guarding  Pt will benefit from further skilled PT to increase strength, flexibility and function  Continue to progress as able  Plan: Continue per plan of care        Precautions: hx of dog bites with increased tenderness on buttock, low back and L LE       Manuals 10/17/22 10/20           STM/ MFR to R lat/ QL and parspinals              Levator/ UT release                                        Neuro Re-Ed  10/20                                                                                                      Ther Ex  10/20           Seated pball stretch  HEP 10x10" ea           Levator scap stretch  HEP 3x30"           Upper trap stretch   3x30"           QL stretch   5x15"           t band rows  BTB 2x10           t band ext   BTB 2x10                                     Ther Activity  10/20           UBE  3'/3'                        Gait Training  10/20                                     Modalities  10/20           Hot pack   10

## 2022-10-24 ENCOUNTER — OFFICE VISIT (OUTPATIENT)
Dept: PHYSICAL THERAPY | Facility: CLINIC | Age: 37
End: 2022-10-24
Payer: MEDICARE

## 2022-10-24 DIAGNOSIS — G89.29 CHRONIC BILATERAL THORACIC BACK PAIN: Primary | ICD-10-CM

## 2022-10-24 DIAGNOSIS — M54.6 CHRONIC BILATERAL THORACIC BACK PAIN: Primary | ICD-10-CM

## 2022-10-24 PROCEDURE — 97110 THERAPEUTIC EXERCISES: CPT

## 2022-10-24 PROCEDURE — 97530 THERAPEUTIC ACTIVITIES: CPT

## 2022-10-24 PROCEDURE — 97140 MANUAL THERAPY 1/> REGIONS: CPT

## 2022-10-24 NOTE — PROGRESS NOTES
Daily Note     Today's date: 10/24/2022  Patient name: Ashutosh Holder  : 1985  MRN: [id number]  Referring provider: Norberto Tillman MD  Dx:   No diagnosis found  Subjective: " I felt really good for two days after therapy but it is tight again today"     Objective: See treatment diary below      Assessment: Saji Dotson presents to therapy with B posterior thoracic/ lumbar restrictions  Cuing for performing slowed controlled stretching to reduce spasming and overactivation  Posteriolateral restrictions noted increased with R > L sided stretching  Reduced palpable spasms noted post with patient report of overall improvement in mobility  Tolerated session without adverse effects  Recommend continued skilled therapy to improve overall strength and mobility for functional return with decreased compensation and pain  Plan: Continue per plan of care        Precautions: hx of dog bites with increased tenderness on buttock, low back and L LE       Manuals 10/17/22 10/20 10/24          STM/ MFR to R lat/ QL and parspinals    x12' to tolerance in prone          Levator/ UT release              Shoulder clock release                           Neuro Re-Ed  10/20                                                                                                      Ther Ex  10/20 10/24          Seated pball stretch  HEP 10x10" ea 10x10"  3ways          Levator scap stretch  HEP 3x30" 3x30" ea          Upper trap stretch   3x30" 3x30"           QL stretch   5x15" 5x15" L          Seated thoracic rot   5" x 10 L          t band rows  BTB 2x10           t band ext   BTB 2x10           Lat stretch                           Ther Activity  10/20 10/24          UBE  3'/3' 3'/3'          diaphragmatic breathing    2'           Gait Training  10/20                                     Modalities  10/20 10/24          Hot pack   10' 10' w/ TE

## 2022-10-26 ENCOUNTER — OFFICE VISIT (OUTPATIENT)
Dept: PAIN MEDICINE | Facility: CLINIC | Age: 37
End: 2022-10-26
Payer: MEDICARE

## 2022-10-26 VITALS
BODY MASS INDEX: 23.96 KG/M2 | DIASTOLIC BLOOD PRESSURE: 84 MMHG | HEIGHT: 67 IN | SYSTOLIC BLOOD PRESSURE: 126 MMHG | HEART RATE: 97 BPM

## 2022-10-26 DIAGNOSIS — M79.2 NEUROPATHIC PAIN OF LOWER EXTREMITY, LEFT: ICD-10-CM

## 2022-10-26 DIAGNOSIS — M79.2 NEUROPATHIC PAIN: Primary | ICD-10-CM

## 2022-10-26 PROCEDURE — 99214 OFFICE O/P EST MOD 30 MIN: CPT | Performed by: NURSE PRACTITIONER

## 2022-10-26 RX ORDER — CYCLOBENZAPRINE HCL 10 MG
10 TABLET ORAL 3 TIMES DAILY PRN
Qty: 90 TABLET | Refills: 2 | Status: SHIPPED | OUTPATIENT
Start: 2022-10-26

## 2022-10-26 RX ORDER — PREGABALIN 100 MG/1
CAPSULE ORAL
Qty: 90 CAPSULE | Refills: 2 | Status: SHIPPED | OUTPATIENT
Start: 2022-10-26

## 2022-10-26 NOTE — PROGRESS NOTES
Assessment:  1  Neuropathic pain    2  Neuropathic pain of lower extremity, left        Plan:  1  I will increase pregabalin to 100 mg in the morning and 200 mg at bedtime for ongoing pain complaints  I advised the patient that if they experience any side effects or issues with the changes in their medication regiment, they should give our office a call to discuss  I also advised the patient not to drive or operate machinery until they see how the changes in the medication regimen affects them  The patient was agreeable and verbalized an understanding  2  Patient may continue cyclobenzaprine 10 mg 3 times a day p r n  myofascial pain  This medication was refilled today  3  Patient may continue duloxetine as prescribed  4  Medical marijuana per certified provider   5  Follow-up in 8 weeks or sooner if needed    History of Present Illness: The patient is a 30 y o  male last seen on 7/1/22 who presents for a follow up office visit in regards to chronic right buttock and left calf pain which persists after being bit by a dog and 2019  EMG of the lower extremities is unremarkable  He gets variable relief with physical therapy  He is currently taking pregabalin 75 mg 3 times a day and duloxetine 60 mg daily and using medical marijuana with 20% improvement of his pain without side effects  He rates his pain an 8/10 on the numeric pain rating scale  Constantly has pain throughout the day which is described as burning, sharp, throbbing, shooting and pins and needles    I have personally reviewed and/or updated the patient's past medical history, past surgical history, family history, social history, current medications, allergies, and vital signs today  Review of Systems:    Review of Systems   Respiratory: Negative for shortness of breath  Cardiovascular: Negative for chest pain  Gastrointestinal: Negative for constipation, diarrhea, nausea and vomiting  Musculoskeletal: Positive for gait problem  Negative for arthralgias, joint swelling and myalgias  Skin: Negative for rash  Neurological: Negative for dizziness, seizures and weakness  All other systems reviewed and are negative  Past Medical History:   Diagnosis Date   • Marijuana use        History reviewed  No pertinent surgical history      Family History   Problem Relation Age of Onset   • No Known Problems Mother    • No Known Problems Daughter        Social History     Occupational History   • Occupation: unemployed   Tobacco Use   • Smoking status: Never Smoker   • Smokeless tobacco: Never Used   Vaping Use   • Vaping Use: Never used   Substance and Sexual Activity   • Alcohol use: Yes     Comment: occasionally   • Drug use: Yes     Types: Marijuana     Comment: Medical marijuana   • Sexual activity: Yes     Partners: Female     Birth control/protection: Condom Male     Comment: 1 partner only at this time         Current Outpatient Medications:   •  busPIRone (BUSPAR) 15 mg tablet, Take 15 mg by mouth 3 (three) times a day, Disp: , Rfl:   •  citalopram (CeleXA) 20 mg tablet, Take 1 tablet (20 mg total) by mouth every morning, Disp: 90 tablet, Rfl: 0  •  cyclobenzaprine (FLEXERIL) 10 mg tablet, Take 1 tablet (10 mg total) by mouth 3 (three) times a day as needed for muscle spasms, Disp: 90 tablet, Rfl: 2  •  DULoxetine (CYMBALTA) 60 mg delayed release capsule, Take 1 capsule (60 mg total) by mouth daily, Disp: 90 capsule, Rfl: 0  •  hydrOXYzine pamoate (VISTARIL) 50 mg capsule, Take 1 capsule (50 mg total) by mouth daily at bedtime, Disp: 90 capsule, Rfl: 0  •  Multiple Vitamin (multivitamin) tablet, Take 1 tablet by mouth daily, Disp: 90 tablet, Rfl: 3  •  pregabalin (LYRICA) 100 mg capsule, Take 1 PO AM and 2 PO HS, Disp: 90 capsule, Rfl: 2    No Known Allergies    Physical Exam:    /84   Pulse 97   Ht 5' 7" (1 702 m)   BMI 23 96 kg/m²     Constitutional:normal, well developed, well nourished, alert, in no distress and non-toxic and no overt pain behavior  Eyes:anicteric  HEENT:grossly intact  Neck:supple, symmetric, trachea midline and no masses   Pulmonary:even and unlabored  Cardiovascular:No edema or pitting edema present  Skin:Normal without rashes or lesions and well hydrated  Psychiatric:Mood and affect appropriate  Neurologic:Cranial Nerves II-XII grossly intact  Musculoskeletal:Slightly antalgic gait but steady without assistive device      Imaging  No orders to display         No orders of the defined types were placed in this encounter

## 2022-10-27 ENCOUNTER — OFFICE VISIT (OUTPATIENT)
Dept: PHYSICAL THERAPY | Facility: CLINIC | Age: 37
End: 2022-10-27
Payer: MEDICARE

## 2022-10-27 DIAGNOSIS — M54.6 CHRONIC BILATERAL THORACIC BACK PAIN: Primary | ICD-10-CM

## 2022-10-27 DIAGNOSIS — G89.29 CHRONIC BILATERAL THORACIC BACK PAIN: Primary | ICD-10-CM

## 2022-10-27 PROCEDURE — 97110 THERAPEUTIC EXERCISES: CPT

## 2022-10-27 NOTE — PROGRESS NOTES
Daily Note     Today's date: 10/27/2022  Patient name: Nuria Tsai  : 1985  MRN: [id number]  Referring provider: Regine Puente MD  Dx:   Encounter Diagnosis     ICD-10-CM    1  Chronic bilateral thoracic back pain  M54 6     G89 29        Start Time: 1130  Stop Time: 1215  Total time in clinic (min): 45 minutes  Subjective:  Pt reports he typically feels better following PT tx but his pain/tightness     Objective: See treatment diary below    Assessment:  Added child's pose 3-way (S) to address T/S + UE tightness - pt initially responds to well; however, notes light cramping in (R) calf/LE and therefore held to prevent further overstimulation of senses - discussed resuming as tolerated in future visits  Open books performed as alternative to seated T/S rotation since pt was already on table - pt reports strong stretch during without adverse Sx but is aware of increased tightness when lying on (L) side  Pt would benefit from continued PT  Plan: Cont /c PT POC  Progress as tolerated        Precautions: hx of dog bites with increased tenderness on buttock, low back and L LE   Manuals 10/17/22 10/20 10/24 10/27         STM/ MFR to R lat/ QL and parspinals    x12' to tolerance in prone          Levator/ UT release              Shoulder clock release                           Neuro Re-Ed  10/20  10/27                                                Ther Ex  10/20 10/24 10/27         Seated pball stretch  HEP 10x10" ea 10x10"  3ways 2'ea 3-way         Levator scap stretch  HEP 3x30" 3x30" ea 30"x3ea         Upper trap stretch   3x30" 3x30"  30"x3ea         QL stretch   5x15" 5x15" L          Seated thoracic rot   5" x 10 L          t band rows  BTB 2x10  9Rx30         t band ext   BTB 2x10  4Rx30         Lat stretch              José Manuel Pose 3-way    5"x5 to R         Open books    5"x5ea                      Ther Activity  10/20 10/24 10/27         UBE  3'/3' 3'/3' 3'/3' L1 twn pks diaphragmatic breathing    2'                                     Modalities  10/20 10/24 10/27         Hot pack   10' 10' w/ TE                           Sp Osullivanel

## 2022-10-31 ENCOUNTER — APPOINTMENT (OUTPATIENT)
Dept: PHYSICAL THERAPY | Facility: CLINIC | Age: 37
End: 2022-10-31

## 2022-11-02 ENCOUNTER — TELEPHONE (OUTPATIENT)
Dept: INTERNAL MEDICINE CLINIC | Facility: CLINIC | Age: 37
End: 2022-11-02

## 2022-11-02 NOTE — TELEPHONE ENCOUNTER
Folder Color-RED    Name of Form- PHYSICIAN CERT     Form to be filled out by-Miguel    Form to be  PICKED UP BY PATIENT  Patient would like to have original form     Patient made aware of 10 business day policy  Hospitalist Daily Progress Note  Name: Roverto Gallardo  Age: 82 y.o. Gender: female  CodeStatus: Full Code  Allergies: No Known Allergies    Chief Complaint:Emesis (nauseated was given 4 mg of zofran and 500 ml of NS )    Primary Care Provider: Hugo Ricardo MD  InpatientTreatment Team: Treatment Team: Attending Provider: Lisa Swain DO; Consulting Physician: Jj Lee MD; : Rashard Emery, RN; Registered Nurse: Inez Bautista, RN; : [place], LSW; Utilization Reviewer: Larry Parks, RN; Registered Nurse: Young Grey, RIGO; Consulting Physician: Kevin Giraldo MD  Admission Date: 3/4/2021      Subjective: Patient seen and evaluated at bedside. She remains confused and agitated. Lap belt ordered overnight for pt safety as she continues to climb out of bed. Pt remains in Afib, basal rate into the 90s, bursts into the 130s. Oriented only to person. Physical Exam  Constitutional:       Comments: Thin and cachectic. Agitated, confused   HENT:      Head: Normocephalic and atraumatic. Mouth/Throat:      Mouth: Mucous membranes are moist.      Pharynx: Oropharynx is clear. Eyes:      Conjunctiva/sclera: Conjunctivae normal.      Pupils: Pupils are equal, round, and reactive to light. Cardiovascular:      Rate and Rhythm: Tachycardia present. Rhythm irregular. Heart sounds: No murmur. No friction rub. No gallop. Pulmonary:      Breath sounds: Rales present. No wheezing or rhonchi. Abdominal:      General: There is no distension. Tenderness: There is no abdominal tenderness. There is no guarding. Musculoskeletal: Normal range of motion. Right lower leg: No edema. Left lower leg: No edema. Neurological:      Mental Status: She is alert. Comments: Oriented only to self. Psychiatric:      Comments: Odd mood and affect, very hard of hearing, but poor insight.           Review of Systems  Reliable ROS not able to be obtained due to confusion. Medications:  Reviewed    Infusion Medications:    dextrose       Scheduled Medications:    metoprolol tartrate  25 mg Oral BID    sodium chloride flush  10 mL Intravenous 2 times per day    insulin lispro  0-12 Units Subcutaneous TID WC    insulin lispro  0-6 Units Subcutaneous Nightly     PRN Meds: sodium chloride flush, promethazine **OR** ondansetron, polyethylene glycol, acetaminophen **OR** acetaminophen, glucose, dextrose, glucagon (rDNA), dextrose    Labs:   Recent Labs     03/04/21  1715 03/05/21  0815   WBC 5.8 8.7   HGB 10.7* 11.6*   HCT 32.6* 34.1*   * 526*     Recent Labs     03/04/21  1715 03/05/21  0815   * 135   K 4.7 3.7   CL 94* 99   CO2 21 20   BUN 13 10   CREATININE 0.85 0.63   CALCIUM 9.2 9.3     Recent Labs     03/05/21  0815   AST 24   ALT 13   BILITOT 0.8*   ALKPHOS 122     Recent Labs     03/05/21  0815   INR 1.0     Recent Labs     03/04/21 1715   TROPONINI <0.010       Urinalysis:   Lab Results   Component Value Date    NITRU Negative 03/04/2021    WBCUA 0-2 03/04/2021    BACTERIA Negative 03/04/2021    RBCUA 0-2 03/04/2021    BLOODU Negative 03/04/2021    SPECGRAV 1.015 03/04/2021    GLUCOSEU Negative 03/04/2021       Radiology:   Most recent    Chest CT      WITH CONTRAST:No results found for this or any previous visit. WITHOUT CONTRAST: No results found for this or any previous visit. CXR      2-view:   Results for orders placed during the hospital encounter of 09/29/18   XR CHEST STANDARD (2 VW)    Narrative EXAMINATION: XR CHEST (2 VW)    CLINICAL HISTORY: SYNCOPE    COMPARISONS: AUGUST 20 17,015    FINDINGS: Osteopenia. Remote fracture healed right humeral neck. Patient leaning to right. Cardiopericardial silhouette normal. Aorta tortuous and calcified. Lungs clear.       Impression NO ACUTE CARDIOPULMONARY DISEASE        Portable:   Results for orders placed during the hospital encounter of 09/16/20   XR CHEST PORTABLE    Narrative EXAMINATION: CHEST PORTABLE VIEW     CLINICAL HISTORY: Midsternal chest pain    COMPARISONS: May 3, 2019     FINDINGS:    Single  views of the chest is submitted. The cardiac silhouette is enlarged. Pulmonary vascular attenuated  Right sided trachea. No focal infiltrates. No Pneumothoraces. Probable old healed fracture right humeral head with associated severe degenerative joint disease                                                                                    Impression NO ACUTE ACTIVE CARDIOPULMONARY PROCESS       Echo No results found for this or any previous visit. Assessment/Plan:    Active Hospital Problems    Diagnosis Date Noted    New onset a-fib ([facility] [address] [I48.91] 03/04/2021    Nausea & vomiting [R11.2] 03/04/2021    Falls [W19. XXXA] 03/04/2021    Weakness [R53.1] 03/04/2021    Hyponatremia [E87.1] 03/04/2021    Pleural effusion [J90] 03/04/2021    Dementia ([facility] [address] [F03.90]     Type 2 diabetes mellitus with complication (HCC) [P33.0] 12/16/2015    CAD (coronary artery disease) [I25.10]      Atrial Fibrillation with RVR: hold anticoagulation for possible procedure inpt. Poor candidate for chronic anticoagulation given frequent falls. Continue beta blocker. Cardiology following. Right pleural effusion: Consult to IR for thoracentesis. Falls with multiple rib fracture: PT/OT evaluation. Dementia: seemingly at baseline per daughter after discussion over the phone. Low dose Remeron before bed, haldol 1mg IM PRN for agitation. DVT PPX held for possible thoracentesis. Additional work up or/and treatment plan may be added today or then after based on clinical progression. I am managing a portion of pt care. Some medical issues are handled byother specialists. Additional work up and treatment should be done in out pt setting by pt PCP and other out pt providers.      In addition to examining and evaluating pt, I spent additional time explaining care, normaland abnormal findings, and treatment

## 2022-11-03 ENCOUNTER — APPOINTMENT (OUTPATIENT)
Dept: PHYSICAL THERAPY | Facility: CLINIC | Age: 37
End: 2022-11-03

## 2022-11-04 ENCOUNTER — OFFICE VISIT (OUTPATIENT)
Dept: PHYSICAL THERAPY | Facility: CLINIC | Age: 37
End: 2022-11-04

## 2022-11-04 DIAGNOSIS — G89.29 CHRONIC BILATERAL THORACIC BACK PAIN: Primary | ICD-10-CM

## 2022-11-04 DIAGNOSIS — M54.6 CHRONIC BILATERAL THORACIC BACK PAIN: Primary | ICD-10-CM

## 2022-11-04 NOTE — PROGRESS NOTES
Daily Note     Today's date: 2022  Patient name: Demetrio   : 1985  MRN: [id number]  Referring provider: Eliz Zhang MD  Dx:   Encounter Diagnosis     ICD-10-CM    1  Chronic bilateral thoracic back pain  M54 6     G89 29                   Subjective: Pt reports lots of stiffness in his neck since he missed Monday's visit  Pt states he feels better after PT session however stiffness and pain returns "after 24 hours"  Objective: See treatment diary below      Assessment: Pt demonstrates good tolerance to TE despite complaints of stiffness  Pt demonstrates appropriate challenge with TE performed  Patient demonstrated fatigue post treatment and would benefit from continued PT to increase flexibility, strength and function  Plan: Continue per plan of care  Progress treatment as tolerated         Precautions: hx of dog bites with increased tenderness on buttock, low back and L LE   Manuals 10/17/22 10/20 10/24 10/27 11/4        STM/ MFR to R lat/ QL and parspinals    x12' to tolerance in prone          Levator/ UT release              Shoulder clock release                           Neuro Re-Ed  10/20  10/27 11                                               Ther Ex  10/20 10/24 10/27 11        Seated pball stretch  HEP 10x10" ea 10x10"  3ways 2'ea 3-way 2'ea 3-way        Levator scap stretch  HEP 3x30" 3x30" ea 30"x3ea 30"x3ea        Upper trap stretch   3x30" 3x30"  30"x3ea 30"x3ea        QL stretch   5x15" 5x15" L  5x15" B        Seated thoracic rot   5" x 10 L  5" x 10 B        t band rows  BTB 2x10  9Rx30 14Rx30        t band ext   BTB 2x10  4Rx30 8Rx30        Lat stretch              José Manuel Pose 3-way    5"x5 to R 5"x10 to R        Open books    5"x5ea 10" x 5 ea                     Ther Activity  10/20 10/24 10/27 11        UBE  3'/3' 3'/3' 3'/3' L1 twn pks 3'/3' L2 twn pks        diaphragmatic breathing    2'                                     Modalities  10/20 10/24 10/27 11/4        Hot pack   10' 10' w/ TE

## 2022-11-10 ENCOUNTER — OFFICE VISIT (OUTPATIENT)
Dept: PHYSICAL THERAPY | Facility: CLINIC | Age: 37
End: 2022-11-10

## 2022-11-10 DIAGNOSIS — G89.29 CHRONIC BILATERAL THORACIC BACK PAIN: Primary | ICD-10-CM

## 2022-11-10 DIAGNOSIS — M54.6 CHRONIC BILATERAL THORACIC BACK PAIN: Primary | ICD-10-CM

## 2022-11-10 NOTE — TELEPHONE ENCOUNTER
Per Dr Ilan Naidu she would like Dr Ana Maria Hurley to review this form as she saw patient most recent  I will discuss with Dr Ana Maria Hurley on 11/28/22 when she returns to the office  I made patient aware I will be reviewing with her at that time

## 2022-11-10 NOTE — PROGRESS NOTES
Daily Note     Today's date: 11/10/2022  Patient name: Michelle Elkins  : 1985  MRN: [id number]  Referring provider: Baldomero Felix MD  Dx:   Encounter Diagnosis     ICD-10-CM    1  Chronic bilateral thoracic back pain  M54 6     G89 29                   Subjective: Patient reports some stiffness in R sided mid back pre treatment session  States exercises and stretches are helping but do not provide extended relief  Objective: See treatment diary below      Assessment: Tolerated treatment well; good tolerance to TE  Patient reports no adverse effects or increase in pain during or after session  Patient reports improvements in stiffness following session and self stretches  Patient exhibited good technique with therapeutic exercises and would benefit from continued PT  Plan: Continue per plan of care  Progress treatment as tolerated         Precautions: hx of dog bites with increased tenderness on buttock, low back and L LE   Manuals 10/17/22 10/20 10/24 10/27 11/4 11/10       STM/ MFR to R lat/ QL and parspinals    x12' to tolerance in prone          Levator/ UT release              Shoulder clock release                           Neuro Re-Ed  10/20  10/27 11/4 11/10                                              Ther Ex  10/20 10/24 10/27 11/4 11/10       Seated pball stretch  HEP 10x10" ea 10x10"  3ways 2'ea 3-way 2'ea 3-way 2' ea 3 way       Levator scap stretch  HEP 3x30" 3x30" ea 30"x3ea 30"x3ea 30s x 3 ea       Upper trap stretch   3x30" 3x30"  30"x3ea 30"x3ea 30s x 3 ea       QL stretch   5x15" 5x15" L  5x15" B 5x15s B       Seated thoracic rot   5" x 10 L  5" x 10 B 5s x 10 B       t band rows  BTB 2x10  9Rx30 14Rx30 14# x 30       t band ext   BTB 2x10  4Rx30 8Rx30 8# x 30       Lat stretch              José Manuel Pose 3-way    5"x5 to R 5"x10 to R 5s x 10 B       Open books    5"x5ea 10" x 5 ea 5s x 10 ea                    Ther Activity  10/20 10/24 10/27 11/4 11/10       UBE  3'/3' 3'/3' 3'/3' L1 twn pks 3'/3' L2 twn pks 3' / 3'       diaphragmatic breathing    2'                                     Modalities  10/20 10/24 10/27 11/4 11/10       Hot pack   10' 10' w/ TE

## 2022-11-14 ENCOUNTER — APPOINTMENT (OUTPATIENT)
Dept: PHYSICAL THERAPY | Facility: CLINIC | Age: 37
End: 2022-11-14

## 2022-11-16 ENCOUNTER — OFFICE VISIT (OUTPATIENT)
Dept: PHYSICAL THERAPY | Facility: CLINIC | Age: 37
End: 2022-11-16

## 2022-11-16 DIAGNOSIS — M54.6 CHRONIC BILATERAL THORACIC BACK PAIN: Primary | ICD-10-CM

## 2022-11-16 DIAGNOSIS — G89.29 CHRONIC BILATERAL THORACIC BACK PAIN: Primary | ICD-10-CM

## 2022-11-16 NOTE — PROGRESS NOTES
Daily Note     Today's date: 2022  Patient name: Roni Mendiola  : 1985  MRN: [id number]  Referring provider: Derrell Luna MD  Dx:   Encounter Diagnosis     ICD-10-CM    1  Chronic bilateral thoracic back pain  M54 6     G89 29                      Subjective: Pt presents to PT reporting stiffness in scapular aspect of R shoulder  Pt reports decreased tightness in same area post PT session  Objective: See treatment diary below      Assessment: Pt demonstrates good tolerance to stretches noting less pain and tightness post PT session  Held TE today focusing on stretches  Patient demonstrated fatigue post treatment, exhibited good technique with therapeutic exercises and would benefit from continued PT to increase flexibility, strength and function  Plan: Continue per plan of care        Precautions: hx of dog bites with increased tenderness on buttock, low back and L LE   Manuals 10/17/22 10/20 10/24 10/27 11/4 11/10 11/16      STM/ MFR to R lat/ QL and parspinals    x12' to tolerance in prone          Levator/ UT release              Shoulder clock release                           Neuro Re-Ed  10/20  10/27 11/4 11/10 11/16                                             Ther Ex  10/20 10/24 10/27 11/4 11/10 11/16      Seated pball stretch  HEP 10x10" ea 10x10"  3ways 2'ea 3-way 2'ea 3-way 2' ea 3 way 2' ea 3 way      Levator scap stretch  HEP 3x30" 3x30" ea 30"x3ea 30"x3ea 30s x 3 ea 30" x 3 ea      Upper trap stretch   3x30" 3x30"  30"x3ea 30"x3ea 30s x 3 ea 30" x 3 ea      QL stretch   5x15" 5x15" L  5x15" B 5x15s B 5x15" B      Seated thoracic rot   5" x 10 L  5" x 10 B 5s x 10 B       t band rows  BTB 2x10  9Rx30 14Rx30 14# x 30       t band ext   BTB 2x10  4Rx30 8Rx30 8# x 30       Lat stretch              José Manuel Pose 3-way    5"x5 to R 5"x10 to R 5s x 10 B 10s x 10 B      Open books    5"x5ea 10" x 5 ea 5s x 10 ea                    Ther Activity  10/20 10/24 10/27 11/4 11/10 11/16      UBE  3'/3' 3'/3' 3'/3' L1 twn pks 3'/3' L2 twn pks 3' / 3' 3' / 3'      diaphragmatic breathing    2'                                     Modalities  10/20 10/24 10/27 11/4 11/10 11/16      Hot pack   10' 10' w/ TE

## 2022-11-17 ENCOUNTER — OFFICE VISIT (OUTPATIENT)
Dept: PHYSICAL THERAPY | Facility: CLINIC | Age: 37
End: 2022-11-17

## 2022-11-17 DIAGNOSIS — M54.6 CHRONIC BILATERAL THORACIC BACK PAIN: Primary | ICD-10-CM

## 2022-11-17 DIAGNOSIS — G89.29 CHRONIC BILATERAL THORACIC BACK PAIN: Primary | ICD-10-CM

## 2022-11-17 NOTE — PROGRESS NOTES
Daily Note     Today's date: 2022  Patient name: Rissa aPn  : 1985  MRN: [id number]  Referring provider: Christa Nogueira MD  Dx:   Encounter Diagnosis     ICD-10-CM    1  Chronic bilateral thoracic back pain  M54 6     G89 29                      Subjective: " I usually feel good for like 2 days after therapy"       Objective: See treatment diary below      Assessment: Vernette Face presents with increased restrictions and mobility deficits in the thoracic/ low back  Noted continued restrictions throughout the posterior chain  Added core stability exercises to facilitate improved activation  Improved posture noted- fatigue with prolonged exercises continues  Tolerated session without adverse effects  Recommend continued skilled therapy to improve overall strength and mobility for functional return with decreased compensation and pain  Plan: Continue per plan of care        Precautions: hx of dog bites with increased tenderness on buttock, low back and L LE   Manuals 10/17/22 10/20 10/24 10/27 11/4 11/10 11/16 11/17     STM/ MFR to R lat/ QL and parspinals    x12' to tolerance in prone          Levator/ UT release              Shoulder clock release                           Neuro Re-Ed  10/20  10/27 11/4 11/10 11/16 11/17     TA contraction         5" x15     Bridges         15     LTR        10x10" ea     Ther Ex  10/20 10/24 10/27 11/4 11/10 11/16 11/17     Seated pball stretch  HEP 10x10" ea 10x10"  3ways 2'ea 3-way 2'ea 3-way 2' ea 3 way 2' ea 3 way 2' ea 3 way      Levator scap stretch  HEP 3x30" 3x30" ea 30"x3ea 30"x3ea 30s x 3 ea 30" x 3 ea 30" x 3ea     Upper trap stretch   3x30" 3x30"  30"x3ea 30"x3ea 30s x 3 ea 30" x 3 ea 30"x3 ea     QL stretch   5x15" 5x15" L  5x15" B 5x15s B 5x15" B      Seated thoracic rot   5" x 10 L  5" x 10 B 5s x 10 B       t band rows  BTB 2x10  9Rx30 14Rx30 14# x 30  14R x30     t band ext   BTB 2x10  4Rx30 8Rx30 8# x 30  8R x30     Lat stretch Tilman Poles Pose 3-way    5"x5 to R 5"x10 to R 5s x 10 B 10s x 10 B      Open books    5"x5ea 10" x 5 ea 5s x 10 ea                    Ther Activity  10/20 10/24 10/27 11/4 11/10 11/16 11/17     UBE  3'/3' 3'/3' 3'/3' L1 twn pks 3'/3' L2 twn pks 3' / 3' 3' / 3' 3'/3'     diaphragmatic breathing    2'           Pt education         KR                  Modalities  10/20 10/24 10/27 11/4 11/10 11/16      Hot pack   10' 10' w/ TE

## 2022-11-21 ENCOUNTER — OFFICE VISIT (OUTPATIENT)
Dept: PHYSICAL THERAPY | Facility: CLINIC | Age: 37
End: 2022-11-21

## 2022-11-21 DIAGNOSIS — G89.29 CHRONIC BILATERAL THORACIC BACK PAIN: Primary | ICD-10-CM

## 2022-11-21 DIAGNOSIS — M54.6 CHRONIC BILATERAL THORACIC BACK PAIN: Primary | ICD-10-CM

## 2022-11-21 NOTE — PROGRESS NOTES
Daily Note     Today's date: 2022  Patient name: Yamilex Kwon  : 1985  MRN: [id number]  Referring provider: Ruddy Patel MD  Dx:   Encounter Diagnosis     ICD-10-CM    1  Chronic bilateral thoracic back pain  M54 6     G89 29           Start Time: 1103  Stop Time: 1134  Total time in clinic (min): 31 minutes    Subjective: Pt reports his back is a little stiff today from resting most of the weekend  Otherwise, his back isn't bothering him too much  Discussed progress with pt  He reports he feels good after therapy but once he starts walking around a lot, he feels he is walking different because of soreness/pain/weakness caused by LE dog bites  When he walks different, he feels his back pain start  Objective: See treatment diary below      Assessment: Tolerated treatment well  Patient required cuing during rows and extensions for form  Bridges were progressed with added abduction force with pt experiencing glute fatigue, R>L  Due to LE weakness and pain from dog bites resulting in back pain, pt would benefit from evaluation of LE strength and increased interventions for LE strengthening  Plan to evaluate LE strength next visit  Continue to progress pt as tolerated next visit  Plan: Continue per plan of care        Precautions: hx of dog bites with increased tenderness on buttock, low back and L LE   Manuals 10/17/22 10/20 10/24 10/27 11/4 11/10 11/16 11/17 11/21    STM/ MFR to R lat/ QL and parspinals    x12' to tolerance in prone          Levator/ UT release              Shoulder clock release              LE strength evaluation         nv    Neuro Re-Ed  10/20  10/27 11/4 11/10 11/16 11/17 11/21    TA contraction         5" x15 5"x10    Bridges         15 2x10 ptb  abduction     LTR        10x10" ea     Ther Ex  10/20 10/24 10/27 11/4 11/10 11/16 11/17 11/21    Seated pball stretch  HEP 10x10" ea 10x10"  3ways 2'ea 3-way 2'ea 3-way 2' ea 3 way 2' ea 3 way 2' ea 3 way  2' ea 3 way Levator scap stretch  HEP 3x30" 3x30" ea 30"x3ea 30"x3ea 30s x 3 ea 30" x 3 ea 30" x 3ea     Upper trap stretch   3x30" 3x30"  30"x3ea 30"x3ea 30s x 3 ea 30" x 3 ea 30"x3 ea 30"x3    QL stretch   5x15" 5x15" L  5x15" B 5x15s B 5x15" B      Seated thoracic rot   5" x 10 L  5" x 10 B 5s x 10 B       t band rows  BTB 2x10  9Rx30 14Rx30 14# x 30  14R x30 15R x30    t band ext   BTB 2x10  4Rx30 8Rx30 8# x 30  8R x30 8R x30    Lat stretch              José Manuel Pose 3-way    5"x5 to R 5"x10 to R 5s x 10 B 10s x 10 B      Open books    5"x5ea 10" x 5 ea 5s x 10 ea                    Ther Activity  10/20 10/24 10/27 11/4 11/10 11/16 11/17 11/21    UBE  3'/3' 3'/3' 3'/3' L1 twn pks 3'/3' L2 twn pks 3' / 3' 3' / 3' 3'/3' 3'/3'    diaphragmatic breathing    2'           Pt education         KR NS                 Modalities  10/20 10/24 10/27 11/4 11/10 11/16  11/21    Hot pack   10' 10' w/ TE

## 2022-11-28 ENCOUNTER — OFFICE VISIT (OUTPATIENT)
Dept: PHYSICAL THERAPY | Facility: CLINIC | Age: 37
End: 2022-11-28

## 2022-11-28 DIAGNOSIS — M54.6 CHRONIC BILATERAL THORACIC BACK PAIN: Primary | ICD-10-CM

## 2022-11-28 DIAGNOSIS — G89.29 CHRONIC BILATERAL THORACIC BACK PAIN: Primary | ICD-10-CM

## 2022-11-28 NOTE — PROGRESS NOTES
Daily Note     Today's date: 2022  Patient name: Marjorie Johnson  : 1985  MRN: [id number]  Referring provider: Jovita Reynaga MD  Dx:   Encounter Diagnosis     ICD-10-CM    1  Chronic bilateral thoracic back pain  M54 6     G89 29                      Subjective: " I am very tight today-it mainly happens when I walk"     Objective: See treatment diary below      Assessment: Shanon Guevara presents with general mobility deficits  Added increased stretching/ mobility with cuing for gentle performance throughout  Noted decreased R sided activation  Gentle progression of hip activation to tolerance  Noted mild fatigue throughout with glute activation  Tolerated session without adverse effects  Recommend continued skilled therapy to improve overall strength and mobility for functional return with decreased compensation and pain  Plan: Continue per plan of care        Precautions: hx of dog bites with increased tenderness on buttock, low back and L LE   Manuals 10/17/22 10/20 10/24 10/27 11/4 11/10 11/16 11/17 11/21 11/28   STM/ MFR to R lat/ QL and parspinals    x12' to tolerance in prone          Levator/ UT release              Shoulder clock release              LE strength evaluation         nv    Neuro Re-Ed  10/20  10/27 11/4 11/10 11/16 11/17 11/21 11/28   clamshells          x20 ea   TA contraction         5" x15 5"x10    SLR          10 ea   Bridges         15 2x10 ptb  abduction  5" x20   LTR        10x10" ea  10x10"   Ther Ex  10/20 10/24 10/27 11/4 11/10 11/16 11/17 11/21 11/28   Seated pball stretch  HEP 10x10" ea 10x10"  3ways 2'ea 3-way 2'ea 3-way 2' ea 3 way 2' ea 3 way 2' ea 3 way  2' ea 3 way  2' 3ea way    Levator scap stretch  HEP 3x30" 3x30" ea 30"x3ea 30"x3ea 30s x 3 ea 30" x 3 ea 30" x 3ea     Upper trap stretch   3x30" 3x30"  30"x3ea 30"x3ea 30s x 3 ea 30" x 3 ea 30"x3 ea 30"x3    QL stretch   5x15" 5x15" L  5x15" B 5x15s B 5x15" B   5x15" B   Seated thoracic rot   5" x 10 L  5" x 10 B 5s x 10 B       t band rows  BTB 2x10  9Rx30 14Rx30 14# x 30  14R x30 15R x30    t band ext   BTB 2x10  4Rx30 8Rx30 8# x 30  8R x30 8R x30    Lat stretch           10x5" s/l   José Manuel Pose 3-way    5"x5 to R 5"x10 to R 5s x 10 B 10s x 10 B      Open books    5"x5ea 10" x 5 ea 5s x 10 ea    5"x10   Piriformis stretch           5x10"   Hamstring stretch           5x10"  ea    Ther Activity  10/20 10/24 10/27 11/4 11/10 11/16 11/17 11/21 11/28   UBE  3'/3' 3'/3' 3'/3' L1 twn pks 3'/3' L2 twn pks 3' / 3' 3' / 3' 3'/3' 3'/3' L1 5' post   diaphragmatic breathing    2'           Pt education         KR NS KR                Modalities  10/20 10/24 10/27 11/4 11/10 11/16  11/21 11/28   Hot pack   10' 10' w/ TE

## 2022-11-29 NOTE — TELEPHONE ENCOUNTER
Patient called to get the status update on form  reviewed with him the 11/10/22 note and today's note   He appreciated and will wait for the call to  form

## 2022-12-01 ENCOUNTER — APPOINTMENT (OUTPATIENT)
Dept: PHYSICAL THERAPY | Facility: CLINIC | Age: 37
End: 2022-12-01

## 2022-12-05 ENCOUNTER — EVALUATION (OUTPATIENT)
Dept: PHYSICAL THERAPY | Facility: CLINIC | Age: 37
End: 2022-12-05

## 2022-12-05 DIAGNOSIS — M54.6 CHRONIC BILATERAL THORACIC BACK PAIN: Primary | ICD-10-CM

## 2022-12-05 DIAGNOSIS — G89.29 CHRONIC BILATERAL THORACIC BACK PAIN: Primary | ICD-10-CM

## 2022-12-05 NOTE — LETTER
2022    Jennifer Fulton MD  [address] [place] [address]    Patient: Amarilis Robert   YOB: 1985   Date of Visit: 2022     Encounter Diagnosis     ICD-10-CM    1  Chronic bilateral thoracic back pain  M54 6     G89 29           Dear Dr Brigette Parra: Thank you for your recent referral of Amarilis Robert  Please review the attached evaluation summary from Danyel's recent visit  Please verify that you agree with the plan of care by signing the attached order  If you have any questions or concerns, please do not hesitate to call  I sincerely appreciate the opportunity to share in the care of one of your patients and hope to have another opportunity to work with you in the near future  Sincerely,    Ze Barker, PT      Referring Provider:      I certify that I have read the below Plan of Care and certify the need for these services furnished under this plan of treatment while under my care  Jennifer Fulton MD  [address] [address]  [place]          PT Re-Evaluation      Today's date: 2022  Patient name: Amarilis Robert  : 1985  MRN: [id number]  Referring provider: Mitul Rizo MD  Dx:   Encounter Diagnosis     ICD-10-CM    1  Chronic bilateral thoracic back pain  M54 6     G89 29            Assessment:   Amarilis Robert  is a pleasant 30 y o  male who presents with thoracic pain, cervical and thoracic muscle restrictions and increased pain  Note continued limitation with mobility mainly due to restrictions throughout the R side limiting overall tolerance for motion  Since beginning therapy as noted slight improvement in mobility- increased restrictions upon arriving today  MMT indicates slight improvement in UE strength on the R side throughout  Improvement in cervical mobility in some planes noted continues to be limited in the levator causing increased difficulty with L sided motion   Tolerated session without adverse effects  Assessed LE strength today due to increased compensations in the back causing continued restrictions  Deficits noted throughout  FOTO 47  Pt  will benefit from skilled PT services that includes manual therapy techniques to enhance tissue extensibility, neuromuscular re-education to facilitate motor control, therapeutic exercise to increase functional mobility, and modalities prn to reduce pain and inflammation  Impairment Goals  - Pt I with initial HEP in 1-2 visits- PROGRESSING   - Improve ROM equal to contralateral side in 4-6 weeks- PROGRESSING continued deficits noted in motion on th R side   - Increase strength to 5/5 in all affected areas in 4-6 weeks- PROGRESSING Improved elbow strength noted     Functional Goals  - Increase Functional Status Measure to >45 for improved functional mobility- PROGRESSING   - Patient will be independent with comprehensive HEP in 6-8 weeks- PROGRESSING continuing to add exercises   - Patient will be able to reach for object from shelf at shoulder height with min reports of difficulty in 2-4 weeks- PROGRESSING pain and limitation continues to be noted max 10#  - Patient will be able to reach for object overhead with min to difficulty in 6-8 weeks- PROGRESSING cramping noted   - Patient will be able to lift/carry objects without provocation of symptoms in 6-8 weeks-PROGRESSING able to tolerate 10-15#     Prognosis: good- will monitor due to chronicity    POC: Skilled physical therapy is recommended 2x a week for 8 weeks  POC expiration on 2/2/23  Planned interventions include: Therapeutic exercise, therapeutic activity, manual, joint mobilization, neuromuscular re-education, HEP, patient education, hot pack, TENS          Subjective Evaluation    History of Present Illness  Mechanism of injury: trauma  Mechanism of injury: Guillermina Long presents with c/c of thoracic pain presents for RE following 11 visits of PT   Notes slight improvement in mobility and ability to transfer exercises over at home  Reports 2 days of improvement in pain levels usually post therapy but notes some difficulty maintaining effects for prolonged periods  Reports 70% of improvement in function  Not a recurrent problem   Quality of life: good    Pain  Current pain ratin  At best pain ratin  At worst pain ratin  Location: neck and the low back           Objective     Postural Observations  Seated posture: good (forward shoulders )  Standing posture: good    Additional Postural Observation Details  Slight improvement in upright positioning     Palpation     Additional Palpation Details  TTP throughout the upper trap and levator today throughout on the R side throughout the lat   TTP throughout the paraspinals/ QL/ lat on the R with palpable spasming and increased hypersensitivity to touch, TTP in the R levator and UT with referral pain into the head    Active Range of Motion   Cervical/Thoracic Spine       Cervical    Flexion: Neck active flexion: pulling in R levator   WFL and with pain  Extension: Neck active extension: on the R side of the neck      WFL and with pain  Left lateral flexion: Neck active lateral bend left: pulling on the R side      WFL  Right lateral flexion: Neck active lateral bend right: pinch on the R      WFL Restriction level minimal  Left rotation:  WFL  Right rotation: Neck active rotation right: pinch on the R side      with pain Restriction level: minimal    Thoracic    Flexion: Active thoracic flexion: strethcing on the front   with pain Restriction level: minimal  Extension: Active thoracic extension: slight relief of pain on the R side      Restriction level: minimal  Left lateral flexion: Active left thoracic lateral flexion: stretching on the R     Restriction level: moderate  Right lateral flexion: Active right thoracic lateral flexion: stretching noed on L   Restriction level: minimal  Left rotation: Active left thoracic rotation: tight throughout the R side   Restriction level: moderate  Right rotation:  WFL  Left Shoulder   Flexion: 180 degrees   Abduction: 180 degrees   External rotation BTH: T4     Right Shoulder   Flexion: 130 degrees with pain  Abduction: 150 (pullingi n lat ) degrees with pain  External rotation BTH: T2   Internal rotation BTB: T7     Additional Active Range of Motion Details  Increased R levator pain upon arrival today as compared to normal     Strength/Myotome Testing     Left Shoulder     Planes of Motion   Flexion: 4+   Extension: 4+   Abduction: 4+   External rotation at 0°: 4+   Internal rotation at 0°: 4+     Right Shoulder     Planes of Motion   Flexion: 4 (pain in the top of the shoulder)   Extension: 4   Abduction: 4- (pain in the lat and post shoulder)   External rotation at 0°: 4 (pulling in top of the neck )   Internal rotation at 0°: 4 (in post shoulder)     Left Elbow   Flexion: 4+  Extension: 4+    Right Elbow   Flexion: 4 (pain in the top of the shoulder)  Extension: 4    Left Hip   Planes of Motion   Flexion: 4  Abduction: 4- (pain in glutes)  External rotation: 4-    Right Hip   Planes of Motion   Flexion: 4+  Abduction: 4- (pain in glutes)  External rotation: 4-    Additional Strength Details  Knee flexion: R 4+ L 4  Knee ext R:4+ L:4 limited by pain in the calf            Precautions: hx of dog bites with increased tenderness on buttock, low back and L LE   Precautions: hx of dog bites with increased tenderness on buttock, low back and L LE   Manuals 12/5 10/20 10/24 10/27 11/4 11/10 11/16 11/17 11/21 11/28   STM/ MFR to R lat/ QL and parspinals      x12' to tolerance in prone                 Levator/ UT release                        Shoulder clock release                        LE strength evaluation                 nv     Neuro Re-Ed  12/5 10/20   10/27 11/4 11/10 11/16 11/17 11/21 11/28   clamshells                   x20 ea   TA contraction   3"x15             5" x15 5"x10   SLR                   10 ea   Bridges                15 2x10 ptb  abduction  5" x20   Chin tucks  3"x15            LTR               10x10" ea   10x10"   Ther Ex  12/5 10/20 10/24 10/27 11/4 11/10 11/16 11/17 11/21 11/28   Seated pball stretch  10x10" ea  10x10" ea 10x10"  3ways 2'ea 3-way 2'ea 3-way 2' ea 3 way 2' ea 3 way 2' ea 3 way  2' ea 3 way  2' 3ea way    Levator scap stretch  3x30"  3x30" 3x30" ea 30"x3ea 30"x3ea 30s x 3 ea 30" x 3 ea 30" x 3ea       Upper trap stretch   3x30"  3x30" 3x30"  30"x3ea 30"x3ea 30s x 3 ea 30" x 3 ea 30"x3 ea 30"x3     QL stretch   5x15" L 5x15" 5x15" L   5x15" B 5x15s B 5x15" B     5x15" B   Seated thoracic rot     5" x 10 L   5" x 10 B 5s x 10 B           t band rows   BTB 2x10   9Rx30 14Rx30 14# x 30   14R x30 15R x30     t band ext    BTB 2x10   4Rx30 8Rx30 8# x 30   8R x30 8R x30     Lat stretch                    10x5" s/l   José Manuel Pose 3-way       5"x5 to R 5"x10 to R 5s x 10 B 10s x 10 B         Open books       5"x5ea 10" x 5 ea 5s x 10 ea       5"x10   Piriformis stretch                    5x10"   Hamstring stretch                    5x10"  ea    Ther Activity  12/5 10/20 10/24 10/27 11/4 11/10 11/16 11/17 11/21 11/28   UBE   3'/3' 3'/3' 3'/3' L1 twn pks 3'/3' L2 twn pks 3' / 3' 3' / 3' 3'/3' 3'/3' L1 5' post   diaphragmatic breathing      2'                  Pt education   KR             CHARITY Puckett                     Modalities  12/5 10/20 10/24 10/27 11/4 11/10 11/16   11/21 11/28   Hot pack   HP w/ TE 10' 10' w/ TE

## 2022-12-05 NOTE — PROGRESS NOTES
PT Re-Evaluation      Today's date: 2022  Patient name: Petra Apgar  : 1985  MRN: [id number]  Referring provider: Erma Laurent MD  Dx:   Encounter Diagnosis     ICD-10-CM    1  Chronic bilateral thoracic back pain  M54 6     G89 29            Assessment:   Petra Apgar  is a pleasant 30 y o  male who presents with thoracic pain, cervical and thoracic muscle restrictions and increased pain  Note continued limitation with mobility mainly due to restrictions throughout the R side limiting overall tolerance for motion  Since beginning therapy as noted slight improvement in mobility- increased restrictions upon arriving today  MMT indicates slight improvement in UE strength on the R side throughout  Improvement in cervical mobility in some planes noted continues to be limited in the levator causing increased difficulty with L sided motion  Tolerated session without adverse effects  Assessed LE strength today due to increased compensations in the back causing continued restrictions  Deficits noted throughout  FOTO 47  Pt  will benefit from skilled PT services that includes manual therapy techniques to enhance tissue extensibility, neuromuscular re-education to facilitate motor control, therapeutic exercise to increase functional mobility, and modalities prn to reduce pain and inflammation        Impairment Goals  - Pt I with initial HEP in 1-2 visits- PROGRESSING   - Improve ROM equal to contralateral side in 4-6 weeks- PROGRESSING continued deficits noted in motion on th R side   - Increase strength to 5/5 in all affected areas in 4-6 weeks- PROGRESSING Improved elbow strength noted     Functional Goals  - Increase Functional Status Measure to >45 for improved functional mobility- PROGRESSING   - Patient will be independent with comprehensive HEP in 6-8 weeks- PROGRESSING continuing to add exercises   - Patient will be able to reach for object from shelf at shoulder height with min reports of difficulty in 2-4 weeks- PROGRESSING pain and limitation continues to be noted max 10#  - Patient will be able to reach for object overhead with min to difficulty in 6-8 weeks- PROGRESSING cramping noted   - Patient will be able to lift/carry objects without provocation of symptoms in 6-8 weeks-PROGRESSING able to tolerate 10-15#     Prognosis: good- will monitor due to chronicity    POC: Skilled physical therapy is recommended 2x a week for 8 weeks  POC expiration on 23  Planned interventions include: Therapeutic exercise, therapeutic activity, manual, joint mobilization, neuromuscular re-education, HEP, patient education, hot pack, TENS          Subjective Evaluation    History of Present Illness  Mechanism of injury: trauma  Mechanism of injury: Petra Apgar presents with c/c of thoracic pain presents for RE following 11 visits of PT  Notes slight improvement in mobility and ability to transfer exercises over at home  Reports 2 days of improvement in pain levels usually post therapy but notes some difficulty maintaining effects for prolonged periods  Reports 70% of improvement in function             Not a recurrent problem   Quality of life: good    Pain  Current pain ratin  At best pain ratin  At worst pain ratin  Location: neck and the low back           Objective     Postural Observations  Seated posture: good (forward shoulders )  Standing posture: good    Additional Postural Observation Details  Slight improvement in upright positioning     Palpation     Additional Palpation Details  TTP throughout the upper trap and levator today throughout on the R side throughout the lat   TTP throughout the paraspinals/ QL/ lat on the R with palpable spasming and increased hypersensitivity to touch, TTP in the R levator and UT with referral pain into the head    Active Range of Motion   Cervical/Thoracic Spine       Cervical    Flexion: Neck active flexion: pulling in R levator   WFL and with pain  Extension: Neck active extension: on the R side of the neck      WFL and with pain  Left lateral flexion: Neck active lateral bend left: pulling on the R side      WFL  Right lateral flexion: Neck active lateral bend right: pinch on the R      WFL Restriction level minimal  Left rotation:  WFL  Right rotation: Neck active rotation right: pinch on the R side      with pain Restriction level: minimal    Thoracic    Flexion: Active thoracic flexion: strethcing on the front   with pain Restriction level: minimal  Extension: Active thoracic extension: slight relief of pain on the R side      Restriction level: minimal  Left lateral flexion: Active left thoracic lateral flexion: stretching on the R     Restriction level: moderate  Right lateral flexion: Active right thoracic lateral flexion: stretching noed on L   Restriction level: minimal  Left rotation: Active left thoracic rotation: tight throughout the R side   Restriction level: moderate  Right rotation:  WFL  Left Shoulder   Flexion: 180 degrees   Abduction: 180 degrees   External rotation BTH: T4     Right Shoulder   Flexion: 130 degrees with pain  Abduction: 150 (pullingi n lat ) degrees with pain  External rotation BTH: T2   Internal rotation BTB: T7     Additional Active Range of Motion Details  Increased R levator pain upon arrival today as compared to normal     Strength/Myotome Testing     Left Shoulder     Planes of Motion   Flexion: 4+   Extension: 4+   Abduction: 4+   External rotation at 0°: 4+   Internal rotation at 0°: 4+     Right Shoulder     Planes of Motion   Flexion: 4 (pain in the top of the shoulder)   Extension: 4   Abduction: 4- (pain in the lat and post shoulder)   External rotation at 0°: 4 (pulling in top of the neck )   Internal rotation at 0°: 4 (in post shoulder)     Left Elbow   Flexion: 4+  Extension: 4+    Right Elbow   Flexion: 4 (pain in the top of the shoulder)  Extension: 4    Left Hip   Planes of Motion   Flexion: 4  Abduction: 4- (pain in glutes)  External rotation: 4-    Right Hip   Planes of Motion   Flexion: 4+  Abduction: 4- (pain in glutes)  External rotation: 4-    Additional Strength Details  Knee flexion: R 4+ L 4  Knee ext R:4+ L:4 limited by pain in the calf             Precautions: hx of dog bites with increased tenderness on buttock, low back and L LE   Precautions: hx of dog bites with increased tenderness on buttock, low back and L LE   Manuals 12/5 10/20 10/24 10/27 11/4 11/10 11/16 11/17 11/21 11/28   STM/ MFR to R lat/ QL and parspinals      x12' to tolerance in prone                 Levator/ UT release                        Shoulder clock release                        LE strength evaluation                 nv     Neuro Re-Ed  12/5 10/20   10/27 11/4 11/10 11/16 11/17 11/21 11/28   clamshells                   x20 ea   TA contraction   3"x15             5" x15 5"x10     SLR                   10 ea   Bridges                15 2x10 ptb  abduction  5" x20   Chin tucks  3"x15            LTR               10x10" ea   10x10"   Ther Ex  12/5 10/20 10/24 10/27 11/4 11/10 11/16 11/17 11/21 11/28   Seated pball stretch  10x10" ea  10x10" ea 10x10"  3ways 2'ea 3-way 2'ea 3-way 2' ea 3 way 2' ea 3 way 2' ea 3 way  2' ea 3 way  2' 3ea way    Levator scap stretch  3x30"  3x30" 3x30" ea 30"x3ea 30"x3ea 30s x 3 ea 30" x 3 ea 30" x 3ea       Upper trap stretch   3x30"  3x30" 3x30"  30"x3ea 30"x3ea 30s x 3 ea 30" x 3 ea 30"x3 ea 30"x3     QL stretch   5x15" L 5x15" 5x15" L   5x15" B 5x15s B 5x15" B     5x15" B   Seated thoracic rot     5" x 10 L   5" x 10 B 5s x 10 B           t band rows   BTB 2x10   9Rx30 14Rx30 14# x 30   14R x30 15R x30     t band ext    BTB 2x10   4Rx30 8Rx30 8# x 30   8R x30 8R x30     Lat stretch                    10x5" s/l   José Manuel Pose 3-way       5"x5 to R 5"x10 to R 5s x 10 B 10s x 10 B         Open books       5"x5ea 10" x 5 ea 5s x 10 ea       5"x10   Piriformis stretch                    5x10"   Hamstring stretch                    5x10"  ea    Ther Activity  12/5 10/20 10/24 10/27 11/4 11/10 11/16 11/17 11/21 11/28   UBE   3'/3' 3'/3' 3'/3' L1 twn pks 3'/3' L2 twn pks 3' / 3' 3' / 3' 3'/3' 3'/3' L1 5' post   diaphragmatic breathing      2'                  Pt education   CHARITY Moscoso                     Modalities  12/5 10/20 10/24 10/27 11/4 11/10 11/16   11/21 11/28   Hot pack   HP w/ TE 10' 10' w/ TE

## 2022-12-08 ENCOUNTER — OFFICE VISIT (OUTPATIENT)
Dept: PHYSICAL THERAPY | Facility: CLINIC | Age: 37
End: 2022-12-08

## 2022-12-08 DIAGNOSIS — M54.6 CHRONIC BILATERAL THORACIC BACK PAIN: Primary | ICD-10-CM

## 2022-12-08 DIAGNOSIS — G89.29 CHRONIC BILATERAL THORACIC BACK PAIN: Primary | ICD-10-CM

## 2022-12-08 NOTE — PROGRESS NOTES
Daily Note     Today's date: 2022  Patient name: Radha Hinton  : 1985  MRN: [id number]  Referring provider: Sharmila Shabazz MD  Dx:   Encounter Diagnosis     ICD-10-CM    1  Chronic bilateral thoracic back pain  M54 6     G89 29           Start Time: 1157  Stop Time: 1235  Total time in clinic (min): 38 minutes    Subjective: pt reports that he has primarily tightness in his back and L leg and that he normally feels improvement with exercises, but only for a day or two  Objective: See treatment diary below      Assessment: Tolerated treatment well  Patient would benefit from continued PT  Today's session centered around improving weight bearing and tolerance of affected LE and UE t functional mobility  Pt demonstrated increased symptoms especially w/ offset L exercises, encouraged to take frequent rest breaks which modulated his pain  Consider trialing elliptical to improve activity tolerance to more upright and functional activities  Pt expresses his continued functional mobility deficits with activities such as walking (flat and with grade) and stair negotiation, and would benefit from continued PT to improve functional mobility and activity tolerance  1:1 with Niranjan Mckeon DPT for entirety of tx  Plan: Continue per plan of care        Precautions: hx of dog bites with increased tenderness on buttock, low back and L LE   Precautions: hx of dog bites with increased tenderness on buttock, low back and L LE   Manuals 12/5 12/8 10/24 10/27 11/4 11/10 11/16 11/17 11/21 11/28   STM/ MFR to R lat/ QL and parspinals      x12' to tolerance in prone                 Levator/ UT release                        Shoulder clock release                        LE strength evaluation                 nv     Neuro Re-Ed  12/5 12/8   10/27 11/4 11/10 11/16 11/17 11/21 11/28   clamshells                   x20 ea   TA contraction   3"x15             5" x15 5"x10     SLR                   10 ea   Bridges                15 2x10 ptb  abduction  5" x20   Chin tucks  3"x15            Sissy Squats  3x8 @ // bars           Rebounder  x10 ea offset L  Airex           Offset Airex Walking  Offset L  Airex  5 trips 10ft                                     LTR               10x10" ea   10x10"   Ther Ex  12/5 12/8 10/24 10/27 11/4 11/10 11/16 11/17 11/21 11/28   Seated pball stretch  10x10" ea   10x10"  3ways 2'ea 3-way 2'ea 3-way 2' ea 3 way 2' ea 3 way 2' ea 3 way  2' ea 3 way  2' 3ea way    Levator scap stretch  3x30"   3x30" ea 30"x3ea 30"x3ea 30s x 3 ea 30" x 3 ea 30" x 3ea       Upper trap stretch   3x30"   3x30"  30"x3ea 30"x3ea 30s x 3 ea 30" x 3 ea 30"x3 ea 30"x3     QL stretch   5x15" L  5x15" L   5x15" B 5x15s B 5x15" B     5x15" B   Seated thoracic rot     5" x 10 L   5" x 10 B 5s x 10 B           t band rows      9Rx30 14Rx30 14# x 30   14R x30 15R x30     t band ext       4Rx30 8Rx30 8# x 30   8R x30 8R x30     Lat stretch                    10x5" s/l   José Manuel Pose 3-way       5"x5 to R 5"x10 to R 5s x 10 B 10s x 10 B         Open books       5"x5ea 10" x 5 ea 5s x 10 ea       5"x10   Piriformis stretch                    5x10"                                                                    Hamstring stretch                    5x10"  ea    Ther Activity  12/5 12/8 10/24 10/27 11/4 11/10 11/16 11/17 11/21 11/28   UBE   3'/3' 3'/3' 3'/3' L1 twn pks 3'/3' L2 twn pks 3' / 3' 3' / 3' 3'/3' 3'/3' L1 5' post   Deadlift  18#KB 3x8                        diaphragmatic breathing      2'                  Pt education   KR             CHARITY Seals Meiers                     Modalities  12/5 12/8 10/24 10/27 11/4 11/10 11/16   11/21 11/28   Hot pack   HP w/ TE  10' w/ TE

## 2022-12-12 ENCOUNTER — OFFICE VISIT (OUTPATIENT)
Dept: PHYSICAL THERAPY | Facility: CLINIC | Age: 37
End: 2022-12-12

## 2022-12-12 DIAGNOSIS — M54.6 CHRONIC BILATERAL THORACIC BACK PAIN: Primary | ICD-10-CM

## 2022-12-12 DIAGNOSIS — G89.29 CHRONIC BILATERAL THORACIC BACK PAIN: Primary | ICD-10-CM

## 2022-12-12 NOTE — PROGRESS NOTES
Daily Note     Today's date: 2022  Patient name: Aleida Pereyra  : 1985  MRN: [id number]  Referring provider: Nahed Murry MD  Dx:   Encounter Diagnosis     ICD-10-CM    1  Chronic bilateral thoracic back pain  M54 6     G89 29                      Subjective: " I am really sore today I had some pain over the weekend"     Objective: See treatment diary below      Assessment: Xuan Arias presents with low back pain/ mobility deficits  Progressed posteiror chain activation with focus on scap activation and posture throughout- mild fatigue noted  Heavy cuing for scap activation to reduce UT compensation  Tolerated session without adverse effects  Recommend continued skilled therapy to improve overall strength and mobility for functional return with decreased compensation and pain  Plan: Continue per plan of care        Precautions: hx of dog bites with increased tenderness on buttock, low back and L LE   Precautions: hx of dog bites with increased tenderness on buttock, low back and L LE   Manuals 12/5 12/8 12/12 10/27 11/4 11/10 11/16 11/17 11/21 11/28   STM/ MFR to R lat/ QL and parspinals                       Levator/ UT release                        Shoulder clock release                        LE strength evaluation                 nv     Neuro Re-Ed  12/5 12/8 12/12 10/27 11/4 11/10 11/16 11/17 11/21 11/28   clamshells                   x20 ea   TA contraction   3"x15             5" x15 5"x10     SLR                   10 ea   Bridges                15 2x10 ptb  abduction  5" x20   Chin tucks  3"x15            Sissy Squats  3x8 @ // bars           Rebounder  x10 ea offset L  Airex           Offset Airex Walking  Offset L  Airex  5 trips 10ft           paloff press    7 5 R x 10 ea           Shayne rows/ ext    15R/13R x 20           No moneys    GTB x20          Reverse flys    GTB x 20           LTR               10x10" ea   10x10"   Ther Ex  12/5 12/8 12/12 10/27 11/4 11/10 11/16 11/17 11/21 11/28   Seated pball stretch  10x10" ea   10x10"  3ways 2'ea 3-way 2'ea 3-way 2' ea 3 way 2' ea 3 way 2' ea 3 way  2' ea 3 way  2' 3ea way    Levator scap stretch  3x30"    30"x3ea 30"x3ea 30s x 3 ea 30" x 3 ea 30" x 3ea       Upper trap stretch   3x30"    30"x3ea 30"x3ea 30s x 3 ea 30" x 3 ea 30"x3 ea 30"x3     QL stretch   5x15" L  5x15" L   5x15" B 5x15s B 5x15" B     5x15" B   Seated thoracic rot        5" x 10 B 5s x 10 B           t band rows      9Rx30 14Rx30 14# x 30   14R x30 15R x30     t band ext       4Rx30 8Rx30 8# x 30   8R x30 8R x30                        10x5" s/l   José Manuel Pose 3-way       5"x5 to R 5"x10 to R 5s x 10 B 10s x 10 B         Open books       5"x5ea 10" x 5 ea 5s x 10 ea       5"x10   Piriformis stretch                    5x10"                                                                    Hamstring stretch       3x30"              5x10"  ea    Ther Activity  12/5 12/8 12/12 10/27 11/4 11/10 11/16 11/17 11/21 11/28   UBE   3'/3' 3'/3' 3'/3' L1 twn pks 3'/3' L2 twn pks 3' / 3' 3' / 3' 3'/3' 3'/3' L1 5' post   Deadlift  18#KB 3x8           Pitman walks    18# KB x 4 laps           diaphragmatic breathing                       Pt education   KR            CHARITY Gay                     Modalities  12/5 12/8 12/12 10/27 11/4 11/10 11/16   11/21 11/28   Hot pack   HP w/ TE  10' w/ TE

## 2022-12-15 ENCOUNTER — APPOINTMENT (OUTPATIENT)
Dept: PHYSICAL THERAPY | Facility: CLINIC | Age: 37
End: 2022-12-15

## 2022-12-20 NOTE — PROGRESS NOTES
Assessment:  1  Neuropathic pain of both legs        Plan:  1   patient currently taking pregabalin 100 mg in the morning and 200 mg at bedtime without relief  He even admits to doubling up on the dosing on occasion  Because he is not experiencing any relief, I will wean him off of this medication  Patient wasprovided with instructions on how to do so during today's office visit  2   patient may continue medical marijuana per certified prescriber patient is nonopioid therapy from our practice secondary to medical marijuana use  3  Continue with physical therapy and home exercise program  4  Continue duloxetine as prescribed  5  Patient will follow up on a as needed basis  He was given a list of other pain specialist in the area for second opinion    History of Present Illness: The patient is a 30 y o  male last seen on 10/26/22 who presents for a follow up office visit in regards to chronic buttock and left calf pain which persists after being bit by a dog in 2019  EMG of the lower extremities is unremarkable  He gets minor relief with physical therapy  Has tried gabapentin which was ineffective  He is currently on duloxetine 90 mg daily, medical marijuana, and pregabalin 100 mg in the morning and 200 mg at bedtime with a 40% improvement of his pain  He states he on occasion doubles up pregabalin dosing for pain  He is in the process of trying to obtain disability  His pain an 8 out of 10 on the numeric pain rating scale  He constantly has pain throughout the day which is described as burning, cramping, pressure-like and shooting    I have personally reviewed and/or updated the patient's past medical history, past surgical history, family history, social history, current medications, allergies, and vital signs today  Review of Systems:    Review of Systems   Respiratory: Negative for shortness of breath  Cardiovascular: Negative for chest pain     Gastrointestinal: Negative for constipation, diarrhea, nausea and vomiting  Musculoskeletal: Positive for gait problem  Negative for arthralgias, joint swelling and myalgias  Skin: Negative for rash  Neurological: Negative for dizziness, seizures and weakness  All other systems reviewed and are negative  Past Medical History:   Diagnosis Date   • Marijuana use        History reviewed  No pertinent surgical history      Family History   Problem Relation Age of Onset   • No Known Problems Mother    • No Known Problems Daughter        Social History     Occupational History   • Occupation: unemployed   Tobacco Use   • Smoking status: Never   • Smokeless tobacco: Never   Vaping Use   • Vaping Use: Never used   Substance and Sexual Activity   • Alcohol use: Yes     Comment: occasionally   • Drug use: Yes     Types: Marijuana     Comment: Medical marijuana   • Sexual activity: Yes     Partners: Female     Birth control/protection: Condom Male     Comment: 1 partner only at this time         Current Outpatient Medications:   •  busPIRone (BUSPAR) 15 mg tablet, Take 15 mg by mouth 3 (three) times a day, Disp: , Rfl:   •  citalopram (CeleXA) 20 mg tablet, Take 1 tablet (20 mg total) by mouth every morning, Disp: 90 tablet, Rfl: 0  •  cyclobenzaprine (FLEXERIL) 10 mg tablet, Take 1 tablet (10 mg total) by mouth 3 (three) times a day as needed for muscle spasms, Disp: 90 tablet, Rfl: 2  •  DULoxetine (CYMBALTA) 60 mg delayed release capsule, Take 1 capsule (60 mg total) by mouth daily, Disp: 90 capsule, Rfl: 0  •  hydrOXYzine pamoate (VISTARIL) 50 mg capsule, Take 1 capsule (50 mg total) by mouth daily at bedtime, Disp: 90 capsule, Rfl: 0  •  Multiple Vitamin (multivitamin) tablet, Take 1 tablet by mouth daily, Disp: 90 tablet, Rfl: 3  •  pregabalin (LYRICA) 75 mg capsule, Take 1 capsule (75 mg total) by mouth 3 (three) times a day, Disp: 90 capsule, Rfl: 1    No Known Allergies    Physical Exam:    /74   Temp (!) 93 °F (33 9 °C)   Ht 5' 7" (1 702 m)   Wt 68 5 kg (151 lb)   BMI 23 65 kg/m²     Constitutional:normal, well developed, well nourished, alert, in no distress and non-toxic and no overt pain behavior  Eyes:anicteric  HEENT:grossly intact  Neck:supple, symmetric, trachea midline and no masses   Pulmonary:even and unlabored  Cardiovascular:No edema or pitting edema present  Skin:Normal without rashes or lesions and well hydrated  Psychiatric:Mood and affect appropriate  Neurologic:Cranial Nerves II-XII grossly intact  Musculoskeletal:normal gait      Imaging  No orders to display         No orders of the defined types were placed in this encounter

## 2022-12-21 ENCOUNTER — OFFICE VISIT (OUTPATIENT)
Dept: PAIN MEDICINE | Facility: CLINIC | Age: 37
End: 2022-12-21

## 2022-12-21 VITALS
HEIGHT: 67 IN | DIASTOLIC BLOOD PRESSURE: 74 MMHG | SYSTOLIC BLOOD PRESSURE: 109 MMHG | BODY MASS INDEX: 23.7 KG/M2 | TEMPERATURE: 93 F | WEIGHT: 151 LBS

## 2022-12-21 DIAGNOSIS — G57.93 NEUROPATHIC PAIN OF BOTH LEGS: Primary | ICD-10-CM

## 2022-12-21 RX ORDER — PREGABALIN 75 MG/1
75 CAPSULE ORAL 3 TIMES DAILY
Qty: 90 CAPSULE | Refills: 1 | Status: SHIPPED | OUTPATIENT
Start: 2022-12-21

## 2022-12-21 NOTE — PATIENT INSTRUCTIONS
Pregabalin 75mg 1 capsule in the morning and 2 capsules at bedtime x 5 days  Then decrease to 1 capsule twice a day x 5 days  Then decrease to 1 capsule daily x 5 days  Then discontinue

## 2022-12-22 ENCOUNTER — APPOINTMENT (OUTPATIENT)
Dept: PHYSICAL THERAPY | Facility: CLINIC | Age: 37
End: 2022-12-22

## 2022-12-22 NOTE — PROGRESS NOTES
Daily Note     Today's date: 2022  Patient name: Eric Grajeda  : 1985  MRN: [id number]  Referring provider: Baron Cooper MD  Dx: No diagnosis found  Subjective: ***      Objective: See treatment diary below      Assessment: Tolerated treatment {Tolerated treatment :[id number]}  Patient {assessment:[id number]}      Plan: Continue per plan of care        Precautions: hx of dog bites with increased tenderness on buttock, low back and L LE   Precautions: hx of dog bites with increased tenderness on buttock, low back and L LE   Manuals    STM/ MFR to R lat/ QL and parspinals                    Levator/ UT release                     Shoulder clock release                     LE strength evaluation              nv     Neuro Re-Ed     clamshells                x20 ea   TA contraction   3"x15          5" x15 5"x10     SLR                10 ea   Bridges             15 2x10 ptb  abduction  5" x20   Chin tucks  3"x15            Sissy Squats  3x8 @ // bars           Rebounder  x10 ea offset L  Airex           Offset Airex Walking  Offset L  Airex  5 trips 10ft           paloff press    7 5 R x 10 ea           Shayne rows/ ext    15R/13R x 20           No moneys    GTB x20          Reverse flys    GTB x 20           LTR            10x10" ea   10x10"   Ther Ex     Seated pball stretch  10x10" ea   10x10"  3ways    2' ea 3 way 2' ea 3 way  2' ea 3 way  2' 3ea way    Levator scap stretch  3x30"       30" x 3 ea 30" x 3ea       Upper trap stretch   3x30"       30" x 3 ea 30"x3 ea 30"x3     QL stretch   5x15" L  5x15" L    5x15" B     5x15" B   Seated thoracic rot                   t band rows           14R x30 15R x30     t band ext            8R x30 8R x30                     10x5" s/l   José Manuel Pose 3-way          10s x 10 B         Open books                5"x10   Piriformis stretch                 5x10"                                                                    Hamstring stretch       3x30"           5x10"  ea    Ther Activity  12/5 12/8 12/12 12/22 11/16 11/17 11/21 11/28   UBE   3'/3' 3'/3'    3' / 3' 3'/3' 3'/3' L1 5' post   Deadlift  18#KB 3x8           [place]umba walks    18# KB x 4 laps           diaphragmatic breathing                    Pt education  Sirisha Molina                  Modalities  12/5 12/8 12/12 12/22 11/16 11/21 11/28   Hot pack   HP w/ TE  10' w/ TE

## 2023-02-08 ENCOUNTER — TELEPHONE (OUTPATIENT)
Dept: PSYCHIATRY | Facility: CLINIC | Age: 38
End: 2023-02-08

## 2023-02-08 NOTE — TELEPHONE ENCOUNTER
Contacted patient in regards to Routine Referral in attempts to verify patient's needs of services and add patient to proper wait list  spoke with patient whom stated       Added to talk therapy/ Medication Management  Wait list   Open to Virtual appt/ Early morning in person   Prefers female providers  [place]/ [place] ([facility])

## 2023-06-01 ENCOUNTER — TELEPHONE (OUTPATIENT)
Dept: PSYCHIATRY | Facility: CLINIC | Age: 38
End: 2023-06-01

## 2024-01-03 ENCOUNTER — TELEPHONE (OUTPATIENT)
Dept: PSYCHIATRY | Facility: CLINIC | Age: 39
End: 2024-01-03

## 2024-01-03 NOTE — TELEPHONE ENCOUNTER
Writer called pt to offer therapy at [facility] with josé. Writer lvm to call intake for scheduling.

## 2024-02-21 ENCOUNTER — TELEPHONE (OUTPATIENT)
Dept: PSYCHIATRY | Facility: CLINIC | Age: 39
End: 2024-02-21

## 2024-02-21 NOTE — TELEPHONE ENCOUNTER
Contacted pt. In regards to [facility] Wait List, Pt stated no longer in need of services, removed from [facility].

## 2024-08-18 NOTE — PROGRESS NOTES
Daily Note     Today's date: 1/3/2022  Patient name: Roni Mendiola  : 1985  MRN: [id number]  Referring provider: Aicha Garza DO  Dx:   Encounter Diagnosis     ICD-10-CM    1  Dog bite of buttock, right, sequela  S31 815S     W54  0XXS    2  Left leg pain  M79 [id number]    3  Dog bite of left lower leg with infection, sequela  S81 852S     L08 9     W54  0XXS    4  Chronic right hip pain  M25 551     G89 29                   Subjective: Pt reports he continues to have soreness outside of therapy from exercise progressions  Pt reports IASTM last session did not significnatly increase pain, and he would like to continue incorporating into tx  Objective: See treatment diary below      Assessment: Pt tolerated treatment well  Pt had improved tolerance to IASTM and DF/PF AROM today, and less tolerance to Inv/Eversion and PROM today  Encouraged pt to continue performing heat and AROM at home, and discussed desenitization techniques for home  Increase weight bearing volume as appropriate  1:1 with Roxanne Vargas DPT for entirety of tx  Plan: Continue per plan of care        Precautions: multiple dog bites 2 years ago; high irritability; marijuana use       Manuals 12/1 12/7 12/9 12/13 12/15 12/20 12/22 12/27 12/30 1/3   R hip PROM             L ankle PROM    Attemtped TB; inv/ev attempted; extremely guarded  CM   PF, inv CM PF, inv CM DF, PF, inv np   L ankle MREs             IASTM         L lat ankle L lat ankle   L ankle isometrics    5"x3 ea 3x5; 3s ea 3"x5 ea 3"x5 ea 3"x5 ea 3"x5 ea 3"x5 ea   Neuro Re-Ed             Toe Curls/Spreads 2x30            Short Foot Holds             Prone hip ext      2x10 R 2x10 R 2x10 R 5# knee bent 2x10 R knee bent 2x10 R knee bent   Bridges   3x10 w/ gtb abd 3x10 w/ gtb abd 3x10 w/ gtb abd 3x10 w/ btb abd       Clamshells             Hip CAR             Table hip 3 way  10x ea R 2x10  2x10 ea B 3x10 ea B 2x10 ea B 2x10 ea B dc     Mini squats       2x10 2x10 dc RN called to update    Patient left AMA before provider was able to get to his discharge order and medications sent to the pharmacy. Spoke with RN and informed this provider was working on a complex discharge with another patient, almost done and will get to his very shortly, was told by RN patient did not want to wait and wanted to leave AMA, was informed he did not want to sign the paperwork.      SLS    30"x2 R, attempted L         Ther Ex             Nustep  8' L3 8' L3 8' L3 8 min L4 np       Ankle circles  30x each 30x ea 2x30 x30 30x  30x 30x 30x 30x np   BAPS seated  10x a/p m/l 1' each 1' ea 2x1 min ea; a/p; m/l np   2x 1 min ea; a/p; m/l 2x 1 min ea; a/p; m/l   4 way ankle             Seated heel raises/toe raises  10x 10x x10 2x10 ea 2x10 ea  2x10 ea  2x10 ea   Sidestepping             Sit to Stands  10x 2x10 - staggered L back 2x10 - staggered L back 2x10 - staggered L back 2x10 - staggered L back       DF towel stretch       20"x3 L 20"x3 L 30"x3 L 30"x3 L   Standing gastroc stretch    30"x2 L 4x20s L 20"x3 L 20"x3 L 20"x3 L     Supine KTC        30"x3 R 30"x3 R 30"x3 R   Ther Activity             Step ups        x10 B 4"                  Gait Training             Alter G                          Modalities